# Patient Record
Sex: MALE | Race: WHITE | Employment: OTHER | URBAN - NONMETROPOLITAN AREA
[De-identification: names, ages, dates, MRNs, and addresses within clinical notes are randomized per-mention and may not be internally consistent; named-entity substitution may affect disease eponyms.]

---

## 2019-10-13 ENCOUNTER — APPOINTMENT (OUTPATIENT)
Dept: GENERAL RADIOLOGY | Age: 74
End: 2019-10-13
Payer: MEDICARE

## 2019-10-13 ENCOUNTER — HOSPITAL ENCOUNTER (EMERGENCY)
Age: 74
Discharge: HOME | End: 2019-10-13
Attending: EMERGENCY MEDICINE
Payer: MEDICARE

## 2019-10-13 VITALS
HEART RATE: 91 BPM | SYSTOLIC BLOOD PRESSURE: 151 MMHG | HEIGHT: 64 IN | BODY MASS INDEX: 29.88 KG/M2 | RESPIRATION RATE: 21 BRPM | DIASTOLIC BLOOD PRESSURE: 104 MMHG | TEMPERATURE: 98.1 F | WEIGHT: 175 LBS | OXYGEN SATURATION: 96 %

## 2019-10-13 DIAGNOSIS — J44.1 COPD EXACERBATION (HCC): Primary | ICD-10-CM

## 2019-10-13 DIAGNOSIS — I10 ESSENTIAL HYPERTENSION: ICD-10-CM

## 2019-10-13 LAB
ALBUMIN SERPL-MCNC: 4.2 GM/DL (ref 3.4–5)
ALP BLD-CCNC: 106 IU/L (ref 40–129)
ALT SERPL-CCNC: 52 U/L (ref 10–40)
ANION GAP SERPL CALCULATED.3IONS-SCNC: 10 MMOL/L (ref 4–16)
AST SERPL-CCNC: 24 IU/L (ref 15–37)
BASE EXCESS MIXED: 0.6 (ref 0–1.2)
BASE EXCESS: ABNORMAL (ref 0–3.3)
BASOPHILS ABSOLUTE: 0.1 K/CU MM
BASOPHILS RELATIVE PERCENT: 1.4 % (ref 0–1)
BILIRUB SERPL-MCNC: 0.5 MG/DL (ref 0–1)
BUN BLDV-MCNC: 13 MG/DL (ref 6–23)
CALCIUM SERPL-MCNC: 9.1 MG/DL (ref 8.3–10.6)
CHLORIDE BLD-SCNC: 104 MMOL/L (ref 99–110)
CO2 CONTENT: 24.7 MMOL/L (ref 19–24)
CO2: 25 MMOL/L (ref 21–32)
CREAT SERPL-MCNC: 0.9 MG/DL (ref 0.9–1.3)
DIFFERENTIAL TYPE: ABNORMAL
EOSINOPHILS ABSOLUTE: 0.2 K/CU MM
EOSINOPHILS RELATIVE PERCENT: 2.3 % (ref 0–3)
GFR AFRICAN AMERICAN: >60 ML/MIN/1.73M2
GFR NON-AFRICAN AMERICAN: >60 ML/MIN/1.73M2
GLUCOSE BLD-MCNC: 155 MG/DL (ref 70–99)
HCO3 VENOUS: 23.5 MMOL/L (ref 19–25)
HCT VFR BLD CALC: 46.7 % (ref 42–52)
HEMOGLOBIN: 15.8 GM/DL (ref 13.5–18)
IMMATURE NEUTROPHIL %: 0.6 % (ref 0–0.43)
LYMPHOCYTES ABSOLUTE: 1.4 K/CU MM
LYMPHOCYTES RELATIVE PERCENT: 19.5 % (ref 24–44)
MCH RBC QN AUTO: 30.1 PG (ref 27–31)
MCHC RBC AUTO-ENTMCNC: 33.8 % (ref 32–36)
MCV RBC AUTO: 89 FL (ref 78–100)
MONOCYTES ABSOLUTE: 0.5 K/CU MM
MONOCYTES RELATIVE PERCENT: 7.1 % (ref 0–4)
O2 SAT, VEN: 96.5 % (ref 50–70)
PCO2, VEN: 36.7 MMHG (ref 38–52)
PDW BLD-RTO: 13.4 % (ref 11.7–14.9)
PH VENOUS: 7.41 (ref 7.32–7.42)
PH VENOUS: 7.42 (ref 7.32–7.42)
PLATELET # BLD: 230 K/CU MM (ref 140–440)
PMV BLD AUTO: 8.7 FL (ref 7.5–11.1)
PO2, VEN: 84.4 MMHG (ref 28–48)
POTASSIUM SERPL-SCNC: 3.9 MMOL/L (ref 3.5–5.1)
PRO-BNP: 51 PG/ML
RBC # BLD: 5.25 M/CU MM (ref 4.6–6.2)
SEGMENTED NEUTROPHILS ABSOLUTE COUNT: 4.8 K/CU MM
SEGMENTED NEUTROPHILS RELATIVE PERCENT: 69.1 % (ref 36–66)
SODIUM BLD-SCNC: 139 MMOL/L (ref 135–145)
SOURCE, BLOOD GAS: ABNORMAL
TOTAL IMMATURE NEUTOROPHIL: 0.04 K/CU MM
TOTAL PROTEIN: 6.5 GM/DL (ref 6.4–8.2)
TROPONIN T: <0.01 NG/ML
WBC # BLD: 6.9 K/CU MM (ref 4–10.5)

## 2019-10-13 PROCEDURE — 6370000000 HC RX 637 (ALT 250 FOR IP): Performed by: EMERGENCY MEDICINE

## 2019-10-13 PROCEDURE — 93005 ELECTROCARDIOGRAM TRACING: CPT | Performed by: EMERGENCY MEDICINE

## 2019-10-13 PROCEDURE — 80053 COMPREHEN METABOLIC PANEL: CPT

## 2019-10-13 PROCEDURE — 93010 ELECTROCARDIOGRAM REPORT: CPT | Performed by: INTERNAL MEDICINE

## 2019-10-13 PROCEDURE — 84484 ASSAY OF TROPONIN QUANT: CPT

## 2019-10-13 PROCEDURE — 82800 BLOOD PH: CPT

## 2019-10-13 PROCEDURE — 85025 COMPLETE CBC W/AUTO DIFF WBC: CPT

## 2019-10-13 PROCEDURE — 83880 ASSAY OF NATRIURETIC PEPTIDE: CPT

## 2019-10-13 PROCEDURE — 94640 AIRWAY INHALATION TREATMENT: CPT

## 2019-10-13 PROCEDURE — 99284 EMERGENCY DEPT VISIT MOD MDM: CPT

## 2019-10-13 PROCEDURE — 71046 X-RAY EXAM CHEST 2 VIEWS: CPT

## 2019-10-13 RX ORDER — ACETAMINOPHEN 325 MG/1
650 TABLET ORAL ONCE
Status: COMPLETED | OUTPATIENT
Start: 2019-10-13 | End: 2019-10-13

## 2019-10-13 RX ORDER — LEVOFLOXACIN 500 MG/1
500 TABLET, FILM COATED ORAL DAILY
Qty: 7 TABLET | Refills: 0 | Status: SHIPPED | OUTPATIENT
Start: 2019-10-13 | End: 2019-10-20

## 2019-10-13 RX ORDER — GUAIFENESIN AND DEXTROMETHORPHAN HYDROBROMIDE 600; 30 MG/1; MG/1
1 TABLET, EXTENDED RELEASE ORAL 2 TIMES DAILY
Qty: 28 TABLET | Refills: 0 | Status: SHIPPED | OUTPATIENT
Start: 2019-10-13 | End: 2019-12-26

## 2019-10-13 RX ORDER — IPRATROPIUM BROMIDE AND ALBUTEROL SULFATE 2.5; .5 MG/3ML; MG/3ML
1 SOLUTION RESPIRATORY (INHALATION) ONCE
Status: COMPLETED | OUTPATIENT
Start: 2019-10-13 | End: 2019-10-13

## 2019-10-13 RX ORDER — IPRATROPIUM BROMIDE AND ALBUTEROL SULFATE 2.5; .5 MG/3ML; MG/3ML
1 SOLUTION RESPIRATORY (INHALATION) EVERY 6 HOURS PRN
Qty: 360 ML | Refills: 0 | Status: SHIPPED | OUTPATIENT
Start: 2019-10-13

## 2019-10-13 RX ORDER — LEVOFLOXACIN 500 MG/1
500 TABLET, FILM COATED ORAL ONCE
Status: COMPLETED | OUTPATIENT
Start: 2019-10-13 | End: 2019-10-13

## 2019-10-13 RX ORDER — PREDNISONE 20 MG/1
40 TABLET ORAL ONCE
Status: COMPLETED | OUTPATIENT
Start: 2019-10-13 | End: 2019-10-13

## 2019-10-13 RX ORDER — ACETAMINOPHEN 325 MG/1
650 TABLET ORAL EVERY 6 HOURS PRN
Qty: 120 TABLET | Refills: 3 | Status: SHIPPED | OUTPATIENT
Start: 2019-10-13 | End: 2021-09-12 | Stop reason: ALTCHOICE

## 2019-10-13 RX ORDER — PREDNISONE 10 MG/1
40 TABLET ORAL DAILY
Qty: 16 TABLET | Refills: 0 | Status: SHIPPED | OUTPATIENT
Start: 2019-10-14 | End: 2019-10-18

## 2019-10-13 RX ADMIN — LEVOFLOXACIN 500 MG: 500 TABLET, FILM COATED ORAL at 09:35

## 2019-10-13 RX ADMIN — IPRATROPIUM BROMIDE AND ALBUTEROL SULFATE 1 AMPULE: .5; 3 SOLUTION RESPIRATORY (INHALATION) at 08:35

## 2019-10-13 RX ADMIN — ACETAMINOPHEN 650 MG: 325 TABLET ORAL at 08:36

## 2019-10-13 RX ADMIN — GUAIFENESIN, DEXTROMETHORPHAN HBR 1 TABLET: 600; 30 TABLET ORAL at 08:36

## 2019-10-13 RX ADMIN — PREDNISONE 40 MG: 20 TABLET ORAL at 08:36

## 2019-10-13 ASSESSMENT — PAIN SCALES - GENERAL
PAINLEVEL_OUTOF10: 3
PAINLEVEL_OUTOF10: 3

## 2019-10-13 ASSESSMENT — PAIN DESCRIPTION - ORIENTATION: ORIENTATION: RIGHT

## 2019-10-13 ASSESSMENT — PAIN DESCRIPTION - DESCRIPTORS: DESCRIPTORS: SORE

## 2019-10-13 ASSESSMENT — PAIN DESCRIPTION - LOCATION: LOCATION: SHOULDER

## 2019-10-13 ASSESSMENT — PAIN DESCRIPTION - PAIN TYPE: TYPE: ACUTE PAIN

## 2019-10-15 LAB
EKG ATRIAL RATE: 98 BPM
EKG DIAGNOSIS: NORMAL
EKG P AXIS: 62 DEGREES
EKG P-R INTERVAL: 192 MS
EKG Q-T INTERVAL: 370 MS
EKG QRS DURATION: 84 MS
EKG QTC CALCULATION (BAZETT): 472 MS
EKG R AXIS: -19 DEGREES
EKG T AXIS: 69 DEGREES
EKG VENTRICULAR RATE: 98 BPM

## 2019-10-25 ENCOUNTER — HOSPITAL ENCOUNTER (EMERGENCY)
Age: 74
Discharge: HOME | End: 2019-10-25
Attending: EMERGENCY MEDICINE
Payer: MEDICARE

## 2019-10-25 ENCOUNTER — APPOINTMENT (OUTPATIENT)
Dept: GENERAL RADIOLOGY | Age: 74
End: 2019-10-25
Payer: MEDICARE

## 2019-10-25 VITALS
TEMPERATURE: 98.1 F | DIASTOLIC BLOOD PRESSURE: 64 MMHG | HEART RATE: 102 BPM | RESPIRATION RATE: 16 BRPM | BODY MASS INDEX: 29.88 KG/M2 | HEIGHT: 64 IN | SYSTOLIC BLOOD PRESSURE: 114 MMHG | WEIGHT: 175 LBS | OXYGEN SATURATION: 94 %

## 2019-10-25 DIAGNOSIS — J20.9 ACUTE BRONCHITIS DUE TO INFECTION: Primary | ICD-10-CM

## 2019-10-25 PROCEDURE — 71046 X-RAY EXAM CHEST 2 VIEWS: CPT

## 2019-10-25 PROCEDURE — 6370000000 HC RX 637 (ALT 250 FOR IP): Performed by: EMERGENCY MEDICINE

## 2019-10-25 PROCEDURE — 99283 EMERGENCY DEPT VISIT LOW MDM: CPT

## 2019-10-25 RX ORDER — AZITHROMYCIN 250 MG/1
TABLET, FILM COATED ORAL
Qty: 1 PACKET | Refills: 0 | Status: SHIPPED | OUTPATIENT
Start: 2019-10-25 | End: 2019-11-04

## 2019-10-25 RX ORDER — METHYLPREDNISOLONE 4 MG/1
TABLET ORAL
Qty: 1 KIT | Refills: 0 | Status: SHIPPED | OUTPATIENT
Start: 2019-10-25 | End: 2019-12-26

## 2019-10-25 RX ORDER — PREDNISONE 20 MG/1
60 TABLET ORAL ONCE
Status: COMPLETED | OUTPATIENT
Start: 2019-10-25 | End: 2019-10-25

## 2019-10-25 RX ORDER — AZITHROMYCIN 250 MG/1
500 TABLET, FILM COATED ORAL ONCE
Status: COMPLETED | OUTPATIENT
Start: 2019-10-25 | End: 2019-10-25

## 2019-10-25 RX ADMIN — AZITHROMYCIN MONOHYDRATE 500 MG: 250 TABLET ORAL at 22:30

## 2019-10-25 RX ADMIN — PREDNISONE 60 MG: 20 TABLET ORAL at 22:30

## 2019-10-25 ASSESSMENT — PAIN DESCRIPTION - LOCATION: LOCATION: SHOULDER

## 2019-10-25 ASSESSMENT — PAIN DESCRIPTION - ORIENTATION: ORIENTATION: RIGHT;POSTERIOR

## 2019-10-25 ASSESSMENT — PAIN DESCRIPTION - PAIN TYPE: TYPE: ACUTE PAIN

## 2019-10-25 ASSESSMENT — ENCOUNTER SYMPTOMS
EYES NEGATIVE: 1
SINUS CONGESTION: 1
GASTROINTESTINAL NEGATIVE: 1
RHINORRHEA: 1
COUGH: 1

## 2019-10-25 ASSESSMENT — PAIN DESCRIPTION - DESCRIPTORS: DESCRIPTORS: ACHING;DULL

## 2019-10-25 ASSESSMENT — PAIN - FUNCTIONAL ASSESSMENT: PAIN_FUNCTIONAL_ASSESSMENT: PREVENTS OR INTERFERES SOME ACTIVE ACTIVITIES AND ADLS

## 2019-10-25 ASSESSMENT — PAIN DESCRIPTION - FREQUENCY: FREQUENCY: CONTINUOUS

## 2019-10-25 ASSESSMENT — PAIN DESCRIPTION - ONSET: ONSET: PROGRESSIVE

## 2019-10-25 ASSESSMENT — PAIN SCALES - GENERAL: PAINLEVEL_OUTOF10: 2

## 2019-10-25 ASSESSMENT — PAIN DESCRIPTION - PROGRESSION: CLINICAL_PROGRESSION: NOT CHANGED

## 2019-12-26 ENCOUNTER — HOSPITAL ENCOUNTER (EMERGENCY)
Age: 74
Discharge: HOME | End: 2019-12-26
Attending: EMERGENCY MEDICINE
Payer: MEDICARE

## 2019-12-26 ENCOUNTER — APPOINTMENT (OUTPATIENT)
Dept: GENERAL RADIOLOGY | Age: 74
End: 2019-12-26
Payer: MEDICARE

## 2019-12-26 VITALS
HEIGHT: 64 IN | WEIGHT: 180 LBS | RESPIRATION RATE: 24 BRPM | BODY MASS INDEX: 30.73 KG/M2 | HEART RATE: 112 BPM | OXYGEN SATURATION: 93 % | TEMPERATURE: 99.7 F

## 2019-12-26 DIAGNOSIS — J44.1 COPD EXACERBATION (HCC): Primary | ICD-10-CM

## 2019-12-26 DIAGNOSIS — R06.00 DYSPNEA, UNSPECIFIED TYPE: ICD-10-CM

## 2019-12-26 PROCEDURE — 6370000000 HC RX 637 (ALT 250 FOR IP): Performed by: EMERGENCY MEDICINE

## 2019-12-26 PROCEDURE — 99285 EMERGENCY DEPT VISIT HI MDM: CPT

## 2019-12-26 PROCEDURE — 71046 X-RAY EXAM CHEST 2 VIEWS: CPT

## 2019-12-26 PROCEDURE — 94640 AIRWAY INHALATION TREATMENT: CPT

## 2019-12-26 PROCEDURE — 6360000002 HC RX W HCPCS: Performed by: EMERGENCY MEDICINE

## 2019-12-26 RX ORDER — PREDNISONE 20 MG/1
60 TABLET ORAL ONCE
Status: COMPLETED | OUTPATIENT
Start: 2019-12-26 | End: 2019-12-26

## 2019-12-26 RX ORDER — ALBUTEROL SULFATE 2.5 MG/3ML
2.5 SOLUTION RESPIRATORY (INHALATION) ONCE
Status: COMPLETED | OUTPATIENT
Start: 2019-12-26 | End: 2019-12-26

## 2019-12-26 RX ORDER — IPRATROPIUM BROMIDE AND ALBUTEROL SULFATE 2.5; .5 MG/3ML; MG/3ML
1 SOLUTION RESPIRATORY (INHALATION) ONCE
Status: COMPLETED | OUTPATIENT
Start: 2019-12-26 | End: 2019-12-26

## 2019-12-26 RX ORDER — METHYLPREDNISOLONE 4 MG/1
TABLET ORAL
Qty: 1 KIT | Refills: 0 | Status: SHIPPED | OUTPATIENT
Start: 2019-12-26 | End: 2021-09-12 | Stop reason: ALTCHOICE

## 2019-12-26 RX ADMIN — ALBUTEROL SULFATE 2.5 MG: 2.5 SOLUTION RESPIRATORY (INHALATION) at 05:00

## 2019-12-26 RX ADMIN — PREDNISONE 60 MG: 20 TABLET ORAL at 04:56

## 2019-12-26 RX ADMIN — IPRATROPIUM BROMIDE AND ALBUTEROL SULFATE 1 AMPULE: .5; 3 SOLUTION RESPIRATORY (INHALATION) at 05:00

## 2019-12-27 ENCOUNTER — HOSPITAL ENCOUNTER (EMERGENCY)
Age: 74
Discharge: HOME | End: 2019-12-27
Attending: EMERGENCY MEDICINE
Payer: MEDICARE

## 2019-12-27 ENCOUNTER — APPOINTMENT (OUTPATIENT)
Dept: GENERAL RADIOLOGY | Age: 74
End: 2019-12-27
Payer: MEDICARE

## 2019-12-27 ENCOUNTER — APPOINTMENT (OUTPATIENT)
Dept: CT IMAGING | Age: 74
End: 2019-12-27
Payer: MEDICARE

## 2019-12-27 VITALS
WEIGHT: 180 LBS | RESPIRATION RATE: 21 BRPM | OXYGEN SATURATION: 93 % | BODY MASS INDEX: 30.73 KG/M2 | TEMPERATURE: 98.5 F | DIASTOLIC BLOOD PRESSURE: 73 MMHG | HEIGHT: 64 IN | SYSTOLIC BLOOD PRESSURE: 114 MMHG | HEART RATE: 97 BPM

## 2019-12-27 DIAGNOSIS — J44.1 COPD EXACERBATION (HCC): Primary | ICD-10-CM

## 2019-12-27 LAB
ALBUMIN SERPL-MCNC: 3.8 GM/DL (ref 3.4–5)
ALP BLD-CCNC: 123 IU/L (ref 40–129)
ALT SERPL-CCNC: 120 U/L (ref 10–40)
ANION GAP SERPL CALCULATED.3IONS-SCNC: 14 MMOL/L (ref 4–16)
AST SERPL-CCNC: 41 IU/L (ref 15–37)
BASOPHILS ABSOLUTE: 0 K/CU MM
BASOPHILS RELATIVE PERCENT: 0.2 % (ref 0–1)
BILIRUB SERPL-MCNC: 0.8 MG/DL (ref 0–1)
BUN BLDV-MCNC: 18 MG/DL (ref 6–23)
CALCIUM SERPL-MCNC: 8.9 MG/DL (ref 8.3–10.6)
CHLORIDE BLD-SCNC: 102 MMOL/L (ref 99–110)
CO2: 23 MMOL/L (ref 21–32)
CREAT SERPL-MCNC: 0.9 MG/DL (ref 0.9–1.3)
DIFFERENTIAL TYPE: ABNORMAL
EOSINOPHILS ABSOLUTE: 0 K/CU MM
EOSINOPHILS RELATIVE PERCENT: 0 % (ref 0–3)
GFR AFRICAN AMERICAN: >60 ML/MIN/1.73M2
GFR NON-AFRICAN AMERICAN: >60 ML/MIN/1.73M2
GLUCOSE BLD-MCNC: 166 MG/DL (ref 70–99)
HCT VFR BLD CALC: 42.8 % (ref 42–52)
HEMOGLOBIN: 14.1 GM/DL (ref 13.5–18)
IMMATURE NEUTROPHIL %: 1.1 % (ref 0–0.43)
LYMPHOCYTES ABSOLUTE: 0.5 K/CU MM
LYMPHOCYTES RELATIVE PERCENT: 2.6 % (ref 24–44)
MCH RBC QN AUTO: 29.7 PG (ref 27–31)
MCHC RBC AUTO-ENTMCNC: 32.9 % (ref 32–36)
MCV RBC AUTO: 90.3 FL (ref 78–100)
MONOCYTES ABSOLUTE: 1.1 K/CU MM
MONOCYTES RELATIVE PERCENT: 5.4 % (ref 0–4)
PDW BLD-RTO: 13.8 % (ref 11.7–14.9)
PLATELET # BLD: 267 K/CU MM (ref 140–440)
PMV BLD AUTO: 8.9 FL (ref 7.5–11.1)
POTASSIUM SERPL-SCNC: 4.8 MMOL/L (ref 3.5–5.1)
PRO-BNP: 150.1 PG/ML
RBC # BLD: 4.74 M/CU MM (ref 4.6–6.2)
SEGMENTED NEUTROPHILS ABSOLUTE COUNT: 17.7 K/CU MM
SEGMENTED NEUTROPHILS RELATIVE PERCENT: 90.7 % (ref 36–66)
SODIUM BLD-SCNC: 139 MMOL/L (ref 135–145)
TOTAL IMMATURE NEUTOROPHIL: 0.22 K/CU MM
TOTAL PROTEIN: 6.8 GM/DL (ref 6.4–8.2)
TROPONIN T: <0.01 NG/ML
WBC # BLD: 19.5 K/CU MM (ref 4–10.5)

## 2019-12-27 PROCEDURE — 71275 CT ANGIOGRAPHY CHEST: CPT

## 2019-12-27 PROCEDURE — 94640 AIRWAY INHALATION TREATMENT: CPT

## 2019-12-27 PROCEDURE — 6360000004 HC RX CONTRAST MEDICATION: Performed by: EMERGENCY MEDICINE

## 2019-12-27 PROCEDURE — 99285 EMERGENCY DEPT VISIT HI MDM: CPT

## 2019-12-27 PROCEDURE — 6370000000 HC RX 637 (ALT 250 FOR IP): Performed by: EMERGENCY MEDICINE

## 2019-12-27 PROCEDURE — 83880 ASSAY OF NATRIURETIC PEPTIDE: CPT

## 2019-12-27 PROCEDURE — 93005 ELECTROCARDIOGRAM TRACING: CPT | Performed by: EMERGENCY MEDICINE

## 2019-12-27 PROCEDURE — 71046 X-RAY EXAM CHEST 2 VIEWS: CPT

## 2019-12-27 PROCEDURE — 80053 COMPREHEN METABOLIC PANEL: CPT

## 2019-12-27 PROCEDURE — 93010 ELECTROCARDIOGRAM REPORT: CPT | Performed by: INTERNAL MEDICINE

## 2019-12-27 PROCEDURE — 84484 ASSAY OF TROPONIN QUANT: CPT

## 2019-12-27 PROCEDURE — 85025 COMPLETE CBC W/AUTO DIFF WBC: CPT

## 2019-12-27 RX ORDER — IPRATROPIUM BROMIDE AND ALBUTEROL SULFATE 2.5; .5 MG/3ML; MG/3ML
1 SOLUTION RESPIRATORY (INHALATION) ONCE
Status: COMPLETED | OUTPATIENT
Start: 2019-12-27 | End: 2019-12-27

## 2019-12-27 RX ORDER — DOXYCYCLINE HYCLATE 100 MG
100 TABLET ORAL ONCE
Status: COMPLETED | OUTPATIENT
Start: 2019-12-27 | End: 2019-12-27

## 2019-12-27 RX ORDER — DOXYCYCLINE HYCLATE 100 MG
100 TABLET ORAL 2 TIMES DAILY
Qty: 20 TABLET | Refills: 0 | Status: SHIPPED | OUTPATIENT
Start: 2019-12-27 | End: 2020-01-06

## 2019-12-27 RX ADMIN — IOPAMIDOL 75 ML: 755 INJECTION, SOLUTION INTRAVENOUS at 18:29

## 2019-12-27 RX ADMIN — IPRATROPIUM BROMIDE AND ALBUTEROL SULFATE 1 AMPULE: .5; 3 SOLUTION RESPIRATORY (INHALATION) at 17:06

## 2019-12-27 RX ADMIN — DOXYCYCLINE HYCLATE 100 MG: 100 TABLET, COATED ORAL at 20:00

## 2019-12-27 ASSESSMENT — ENCOUNTER SYMPTOMS
ABDOMINAL PAIN: 0
COUGH: 1
WHEEZING: 1
NAUSEA: 0
SHORTNESS OF BREATH: 1
SHORTNESS OF BREATH: 1
COUGH: 1
SORE THROAT: 0
BACK PAIN: 0
EYE REDNESS: 0
GASTROINTESTINAL NEGATIVE: 1
WHEEZING: 1
RHINORRHEA: 0
VOMITING: 0
EYES NEGATIVE: 1
SPUTUM PRODUCTION: 1

## 2019-12-27 ASSESSMENT — PAIN DESCRIPTION - DESCRIPTORS: DESCRIPTORS: CRAMPING

## 2019-12-27 ASSESSMENT — PAIN SCALES - GENERAL: PAINLEVEL_OUTOF10: 3

## 2019-12-27 ASSESSMENT — PAIN DESCRIPTION - PAIN TYPE: TYPE: ACUTE PAIN

## 2019-12-27 ASSESSMENT — PAIN DESCRIPTION - LOCATION: LOCATION: RIB CAGE

## 2020-01-01 LAB
EKG ATRIAL RATE: 101 BPM
EKG DIAGNOSIS: NORMAL
EKG P AXIS: 32 DEGREES
EKG P-R INTERVAL: 138 MS
EKG Q-T INTERVAL: 362 MS
EKG QRS DURATION: 78 MS
EKG QTC CALCULATION (BAZETT): 469 MS
EKG R AXIS: 17 DEGREES
EKG T AXIS: 48 DEGREES
EKG VENTRICULAR RATE: 101 BPM

## 2021-09-08 ENCOUNTER — HOSPITAL ENCOUNTER (EMERGENCY)
Age: 76
Discharge: LEFT BEFORE DISCHARGE | End: 2021-09-08
Payer: MEDICARE

## 2021-09-09 ENCOUNTER — HOSPITAL ENCOUNTER (EMERGENCY)
Age: 76
Discharge: HOME | End: 2021-09-09
Attending: EMERGENCY MEDICINE
Payer: MEDICARE

## 2021-09-09 VITALS
OXYGEN SATURATION: 95 % | TEMPERATURE: 98.9 F | DIASTOLIC BLOOD PRESSURE: 94 MMHG | WEIGHT: 180 LBS | SYSTOLIC BLOOD PRESSURE: 139 MMHG | BODY MASS INDEX: 30.9 KG/M2 | RESPIRATION RATE: 16 BRPM | HEART RATE: 94 BPM

## 2021-09-09 DIAGNOSIS — M54.50 ACUTE EXACERBATION OF CHRONIC LOW BACK PAIN: Primary | ICD-10-CM

## 2021-09-09 DIAGNOSIS — G89.29 ACUTE EXACERBATION OF CHRONIC LOW BACK PAIN: Primary | ICD-10-CM

## 2021-09-09 PROCEDURE — 99284 EMERGENCY DEPT VISIT MOD MDM: CPT

## 2021-09-09 PROCEDURE — 96372 THER/PROPH/DIAG INJ SC/IM: CPT

## 2021-09-09 PROCEDURE — 6370000000 HC RX 637 (ALT 250 FOR IP): Performed by: EMERGENCY MEDICINE

## 2021-09-09 PROCEDURE — 6360000002 HC RX W HCPCS: Performed by: EMERGENCY MEDICINE

## 2021-09-09 RX ORDER — DIAZEPAM 5 MG/1
5 TABLET ORAL ONCE
Status: COMPLETED | OUTPATIENT
Start: 2021-09-09 | End: 2021-09-09

## 2021-09-09 RX ORDER — ACETAMINOPHEN 500 MG
1000 TABLET ORAL ONCE
Status: COMPLETED | OUTPATIENT
Start: 2021-09-09 | End: 2021-09-09

## 2021-09-09 RX ORDER — CYCLOBENZAPRINE HCL 5 MG
5 TABLET ORAL NIGHTLY PRN
Qty: 10 TABLET | Refills: 0 | Status: SHIPPED | OUTPATIENT
Start: 2021-09-09 | End: 2021-09-12 | Stop reason: ALTCHOICE

## 2021-09-09 RX ORDER — DEXAMETHASONE 4 MG/1
8 TABLET ORAL ONCE
Status: COMPLETED | OUTPATIENT
Start: 2021-09-09 | End: 2021-09-09

## 2021-09-09 RX ORDER — KETOROLAC TROMETHAMINE 15 MG/ML
15 INJECTION, SOLUTION INTRAMUSCULAR; INTRAVENOUS ONCE
Status: COMPLETED | OUTPATIENT
Start: 2021-09-09 | End: 2021-09-09

## 2021-09-09 RX ADMIN — ACETAMINOPHEN 1000 MG: 500 TABLET ORAL at 03:02

## 2021-09-09 RX ADMIN — DEXAMETHASONE 8 MG: 4 TABLET ORAL at 03:02

## 2021-09-09 RX ADMIN — KETOROLAC TROMETHAMINE 15 MG: 15 INJECTION, SOLUTION INTRAMUSCULAR; INTRAVENOUS at 03:02

## 2021-09-09 RX ADMIN — DIAZEPAM 5 MG: 5 TABLET ORAL at 03:06

## 2021-09-09 ASSESSMENT — PAIN SCALES - GENERAL
PAINLEVEL_OUTOF10: 10
PAINLEVEL_OUTOF10: 10

## 2021-09-09 ASSESSMENT — PAIN DESCRIPTION - LOCATION: LOCATION: BACK

## 2021-09-09 ASSESSMENT — PAIN DESCRIPTION - PAIN TYPE: TYPE: ACUTE PAIN

## 2021-09-09 NOTE — ED NOTES
Pt discharged with instructions and prescriptions. Discussed when and how to take medications and pt stated understanding.   Pt walked out of the [facility], RN  09/09/21 8848

## 2021-09-12 ENCOUNTER — HOSPITAL ENCOUNTER (EMERGENCY)
Age: 76
Discharge: HOME | End: 2021-09-12
Attending: EMERGENCY MEDICINE
Payer: MEDICARE

## 2021-09-12 VITALS
HEART RATE: 86 BPM | SYSTOLIC BLOOD PRESSURE: 142 MMHG | HEIGHT: 64 IN | RESPIRATION RATE: 20 BRPM | WEIGHT: 180 LBS | OXYGEN SATURATION: 95 % | TEMPERATURE: 97.8 F | BODY MASS INDEX: 30.73 KG/M2 | DIASTOLIC BLOOD PRESSURE: 94 MMHG

## 2021-09-12 DIAGNOSIS — M79.604 LOW BACK PAIN RADIATING TO RIGHT LOWER EXTREMITY: Primary | ICD-10-CM

## 2021-09-12 DIAGNOSIS — M54.50 LOW BACK PAIN RADIATING TO RIGHT LOWER EXTREMITY: Primary | ICD-10-CM

## 2021-09-12 PROCEDURE — 96372 THER/PROPH/DIAG INJ SC/IM: CPT

## 2021-09-12 PROCEDURE — 6360000002 HC RX W HCPCS: Performed by: EMERGENCY MEDICINE

## 2021-09-12 PROCEDURE — 99283 EMERGENCY DEPT VISIT LOW MDM: CPT

## 2021-09-12 PROCEDURE — 6370000000 HC RX 637 (ALT 250 FOR IP): Performed by: EMERGENCY MEDICINE

## 2021-09-12 RX ORDER — KETOROLAC TROMETHAMINE 15 MG/ML
15 INJECTION, SOLUTION INTRAMUSCULAR; INTRAVENOUS ONCE
Status: COMPLETED | OUTPATIENT
Start: 2021-09-12 | End: 2021-09-12

## 2021-09-12 RX ORDER — METHOCARBAMOL 500 MG/1
500 TABLET, FILM COATED ORAL 3 TIMES DAILY PRN
Qty: 15 TABLET | Refills: 0 | Status: SHIPPED | OUTPATIENT
Start: 2021-09-12 | End: 2021-09-22

## 2021-09-12 RX ORDER — METHYLPREDNISOLONE 4 MG/1
TABLET ORAL
Qty: 1 KIT | Refills: 0 | Status: SHIPPED | OUTPATIENT
Start: 2021-09-12 | End: 2021-09-18

## 2021-09-12 RX ORDER — LIDOCAINE 4 G/G
1 PATCH TOPICAL ONCE
Status: DISCONTINUED | OUTPATIENT
Start: 2021-09-12 | End: 2021-09-12 | Stop reason: HOSPADM

## 2021-09-12 RX ORDER — PREDNISONE 20 MG/1
20 TABLET ORAL ONCE
Status: COMPLETED | OUTPATIENT
Start: 2021-09-12 | End: 2021-09-12

## 2021-09-12 RX ORDER — LIDOCAINE 50 MG/G
1 PATCH TOPICAL DAILY
Qty: 30 PATCH | Refills: 0 | Status: ON HOLD | OUTPATIENT
Start: 2021-09-12 | End: 2022-05-23 | Stop reason: HOSPADM

## 2021-09-12 RX ADMIN — PREDNISONE 20 MG: 20 TABLET ORAL at 02:33

## 2021-09-12 RX ADMIN — KETOROLAC TROMETHAMINE 15 MG: 15 INJECTION, SOLUTION INTRAMUSCULAR; INTRAVENOUS at 02:31

## 2021-09-12 ASSESSMENT — PAIN SCALES - GENERAL
PAINLEVEL_OUTOF10: 6
PAINLEVEL_OUTOF10: 10

## 2021-09-12 NOTE — ED PROVIDER NOTES
Emergency Department Encounter    Patient: Calvin Adame  MRN: [id number]  : 1945  Date of Evaluation: 2021  ED Provider:  Miki Buckner MD      Triage Chief Complaint:   Back Pain (Patient was recently seen here for chronic back pain, was given some medication and discharged, pt states the pain is worse than before  )    [place]:  Calvin Adame is a 68 y.o. male that presents with right lower back pain is since last Tuesday. He denies any known injuries to his back. He states it occasionally radiates down the anterior side of his right leg. Denies any numbness or tingling. He felt the leg was slightly weak yesterday but is not weak today. He has been seeing a chiropractor throughout this time for the pain. No saddle anesthesia, no bowel or bladder dysfunction, no rash, no fevers, no hx of IV drug use. He was seen in the emergency department a few days ago and was given Decadron and a muscle relaxant. He like the medications helped initially but are no longer helping his symptoms. ROS - see HPI, below listed is current ROS at time of my eval:  General:  No fevers  ENT:  No sore throat, no nasal congestion  Cardiovascular:  No chest pain  Respiratory:  No shortness of breath  Gastrointestinal:  No pain, no nausea, no vomiting, no diarrhea  Musculoskeletal:  + back pain, + muscle pain  Skin:  No rash, no pruritis  Neurologic:  No headache, no extremity numbness, no extremity tingling, no extremity weakness  Genitourinary:  No dysuria, no hematuria  Endocrine:  No unexpected weight gain, no unexpected weight loss  Extremities:  no edema, no pain    Past Medical History:   Diagnosis Date    Chronic back pain     Emphysema of lung ([address]     Pneumonia      Past Surgical History:   Procedure Laterality Date    CHOLECYSTECTOMY       No family history on file.   Social History     Socioeconomic History    Marital status:      Spouse name: Not on file    Number of children: Not on file    Years of education: Not on file    Highest education level: Not on file   Occupational History    Not on file   Tobacco Use    Smoking status: Former Smoker     Packs/day: 0.00     Quit date: 2017     Years since quittin.2    Smokeless tobacco: Never Used   Vaping Use    Vaping Use: Never used   Substance and Sexual Activity    Alcohol use: No    Drug use: No    Sexual activity: Yes     Partners: Female   Other Topics Concern    Not on file   Social History Narrative    Not on file     Social Determinants of Health     Financial Resource Strain:     Difficulty of Paying Living Expenses:    Food Insecurity:     Worried About Running Out of Food in the Last Year:     [address] in the Last Year:    Transportation Needs:     Lack of Transportation (Medical):  Lack of Transportation (Non-Medical):    Physical Activity:     Days of Exercise per Week:     Minutes of Exercise per Session:    Stress:     Feeling of Stress :    Social Connections:     Frequency of Communication with Friends and Family:     Frequency of Social Gatherings with Friends and Family:     Attends Orthodoxy Services:     Active Member of Clubs or Organizations:     Attends Club or Organization Meetings:     Marital Status:    Intimate Partner Violence:     Fear of Current or Ex-Partner:     Emotionally Abused:     Physically Abused:     Sexually Abused:      Current Facility-Administered Medications   Medication Dose Route Frequency Provider Last Rate Last Admin    lidocaine 4 % external patch 1 patch  1 patch TransDERmal Once Violet Fernandez MD   1 patch at 21 0232     Current Outpatient Medications   Medication Sig Dispense Refill    lidocaine (LIDODERM) 5 % Place 1 patch onto the skin daily 12 hours on, 12 hours off. 30 patch 0    methylPREDNISolone (MEDROL, RONNI,) 4 MG tablet Take by mouth.  1 kit 0    methocarbamol (ROBAXIN) 500 MG tablet Take 1 tablet by mouth 3 times daily as needed (muscle spasm) 15 tablet 0    fluticasone-salmeterol (ADVAIR) 250-50 MCG/DOSE AEPB Inhale 1 puff into the lungs every 12 hours      ipratropium-albuterol (DUONEB) 0.5-2.5 (3) MG/3ML SOLN nebulizer solution Inhale 3 mLs into the lungs every 6 hours as needed for Shortness of Breath 360 mL 0    albuterol sulfate  (90 BASE) MCG/ACT inhaler Inhale 2 puffs into the lungs every 6 hours as needed for Wheezing       No Known Allergies    Nursing Notes Reviewed    Physical Exam:  Triage VS:    ED Triage Vitals   Enc Vitals Group      BP 09/12/21 0139 (!) 142/94      Pulse 09/12/21 0134 86      Resp 09/12/21 0134 20      Temp 09/12/21 0134 97.8 °F (36.6 °C)      Temp Source 09/12/21 0134 Oral      SpO2 09/12/21 0134 95 %      Weight 09/12/21 0130 180 lb (81.6 kg)      Height 09/12/21 0130 5' 4\" (1.626 m)      Head Circumference --       Peak Flow --       Pain Score --       Pain Loc --       Pain Edu? --       Excl. in [address]? --        My pulse ox interpretation is - normal    General appearance:  No acute distress. Skin:  Warm. Dry. No Rash   Eye:  Extraocular movements intact. Ears, nose, mouth and throat:  Oral mucosa moist   Neck:  Trachea midline. Extremity:  No swelling. Normal ROM     Heart:  Regular  Perfusion:  Intact, 2+ PT pulses in bilateral lower extremities  Respiratory:   Respirations nonlabored. Abdominal:  Normal bowel sounds. Soft. Nontender. Non distended. Back:  No CVA tenderness to palpation, no midline spinal tenderness to palpation or step-offs, mild right upper lumbar paraspinal muscle tenderness to palpation, mild spasm at area of tenderness. Neurological:  Alert and oriented times 3.  5 out of 5 motor strength bilateral lower extremities, sensation intact to light touch in bilateral lower                                  extremities, lower extremity reflexes of the patella and achilles are equal and intact. Straight leg test is not present. Normal gait.      I have reviewed and interpreted all of the currently available lab results from this visit (if applicable):  No results found for this visit on 09/12/21. Radiographs (if obtained):  Radiologist's Report Reviewed:  No results found. MDM:  Patient presented with back pain. There is no evidence for more malignant injury/pathology, such as, but not limited to, cauda equina syndrome, acute spinal cord pathology, Spinal epidural abscess    I completed a structured, evidence-based clinical evaluation to screen for acute non-traumatic spinal emergencies. The patient has a normal detailed neurologic exam and a low red flag score. Patient does not have any urinary complaints or abdominal complaints. Patient taking medications at home with no significant improvement. Pt with no evidence of neurologic or vascular compromise. Bowel and Bladder function intact. Pt able to ambulate with mild pain. No fever or redness over spine. No recent systemic infections. Pt has no history of IV drug use, immunosuppression, recent spinal fractures, recent spinal procedures or indwelling urinary catheter. Pt's pain controlled with pain medication. Treated with a lidocaine patch Toradol and a dose of prednisone in the emergency room. Will discharge home with a prescription for a Medrol dose pack. The evidence indicates that the patient is very low risk for an acute spinal emergency and this is consistent with my clinical intuition, therefore, it is in the patients best interest not to do additional emergent testing. Patient will be given medications, I do believe the patient is a good candidate for outpatient symptomatic treatment.   The patient was instructed on this treatment and the course that this type of back pain typically follows, I also discussed worrisome symptoms to monitor for at home including, but not limited to, numbness or weakness in the lower extremities, bowel or bladder dysfunction, and numbness in the groin. Patient will be discharged with prescriptions, instructions for symptomatic treatment, monitoring and outpatient follow-up, patient understands and agrees, patient given strict return precautions. Patient plans to follow-up with his primary care physician first thing on Monday morning. Plan of care explained to patient. Concerning signs and symptoms warranting a return visit to the Emergency Department were explained in detail. All questions and concerns were addressed to the patient's satisfaction. Patient understood and agreed with plan. I did don appropriate PPE (including face mask, protective eye ware/safety glasses and gloves), as recommended by the health facility/national standard best practice, during my bedside interactions with the patient. Clinical Impression:  1. Low back pain radiating to right lower extremity      Disposition referral (if applicable):  Colonel Mukul MD  [address] [place] [address] [address]  [phone]    In 2 days      [facility] Emergency Department  [facility] [address]  [address] [facility]  [phone]    As needed, If symptoms worsen    Disposition medications (if applicable):  New Prescriptions    LIDOCAINE (LIDODERM) 5 %    Place 1 patch onto the skin daily 12 hours on, 12 hours off. METHOCARBAMOL (ROBAXIN) 500 MG TABLET    Take 1 tablet by mouth 3 times daily as needed (muscle spasm)    METHYLPREDNISOLONE (MEDROL, RONNI,) 4 MG TABLET    Take by mouth. ED Provider Disposition Time  DISPOSITION Decision To Discharge 09/12/2021 03:16:00 AM      Comment: Please note this report has been produced using speech recognition software and may contain errors related to that system including errors in grammar, punctuation, and spelling, as well as words and phrases that may be inappropriate. Efforts were made to edit the dictations.         Cassi Evans MD  09/12/21 7624

## 2021-09-12 NOTE — ED PROVIDER NOTES
Emergency Department Encounter    Patient: Marleny Dye  MRN: [id number]  : 1945  Date of Evaluation: 2021  ED Provider:  Melissa Mendoza MD      Triage Chief Complaint:   Back Pain    [place]:  Marleny Dye is a 68 y.o. male that presents with acute on chronic exacerbation of low back pain. Pain is located in his lower back and is bilateral.  He does report some right-sided radiation to knee. No saddle anesthesia, no bowel or bladder dysfunction, no rash. No pain with urination no increase in urgency or frequency urination. No fever or chills. Patient reports intermittent symptoms that were similar in the past.  Patient denies any acute injury. Patient ports pain exacerbated by movement alleviated with rest.    ROS - see HPI, below listed is current ROS at time of my eval:  General:  No fevers  ENT:  No sore throat, no nasal congestion  Cardiovascular:  No chest pain  Respiratory:  No shortness of breath  Gastrointestinal:  No pain, no nausea, no vomiting, no diarrhea  Musculoskeletal:  + back pain, + muscle pain  Skin:  No rash, no pruritis  Neurologic:  No headache, no extremity numbness, no extremity tingling, no extremity weakness  Genitourinary:  No dysuria, no hematuria  Endocrine:  No unexpected weight gain, no unexpected weight loss  Extremities:  no edema, no pain    Past Medical History:   Diagnosis Date    Chronic back pain     Emphysema of lung ([address]     Pneumonia      Past Surgical History:   Procedure Laterality Date    CHOLECYSTECTOMY       No family history on file.   Social History     Socioeconomic History    Marital status:      Spouse name: Not on file    Number of children: Not on file    Years of education: Not on file    Highest education level: Not on file   Occupational History    Not on file   Tobacco Use    Smoking status: Former Smoker     Packs/day: 0.00     Quit date: 2017     Years since quittin.2    Smokeless tobacco: Never Used   Vaping Use    Vaping Use: Never used   Substance and Sexual Activity    Alcohol use: No    Drug use: No    Sexual activity: Yes     Partners: Female   Other Topics Concern    Not on file   Social History Narrative    Not on file     Social Determinants of Health     Financial Resource Strain:     Difficulty of Paying Living Expenses:    Food Insecurity:     Worried About Running Out of Food in the Last Year:     [address] in the Last Year:    Transportation Needs:     Lack of Transportation (Medical):  Lack of Transportation (Non-Medical):    Physical Activity:     Days of Exercise per Week:     Minutes of Exercise per Session:    Stress:     Feeling of Stress :    Social Connections:     Frequency of Communication with Friends and Family:     Frequency of Social Gatherings with Friends and Family:     Attends Jainism Services:     Active Member of Clubs or Organizations:     Attends Club or Organization Meetings:     Marital Status:    Intimate Partner Violence:     Fear of Current or Ex-Partner:     Emotionally Abused:     Physically Abused:     Sexually Abused:      No current facility-administered medications for this encounter. Current Outpatient Medications   Medication Sig Dispense Refill    lidocaine (LIDODERM) 5 % Place 1 patch onto the skin daily 12 hours on, 12 hours off. 30 patch 0    methylPREDNISolone (MEDROL, RONNI,) 4 MG tablet Take by mouth.  1 kit 0    methocarbamol (ROBAXIN) 500 MG tablet Take 1 tablet by mouth 3 times daily as needed (muscle spasm) 15 tablet 0    fluticasone-salmeterol (ADVAIR) 250-50 MCG/DOSE AEPB Inhale 1 puff into the lungs every 12 hours      ipratropium-albuterol (DUONEB) 0.5-2.5 (3) MG/3ML SOLN nebulizer solution Inhale 3 mLs into the lungs every 6 hours as needed for Shortness of Breath 360 mL 0    albuterol sulfate  (90 BASE) MCG/ACT inhaler Inhale 2 puffs into the lungs every 6 hours as needed for Wheezing Facility-Administered Medications Ordered in Other Encounters   Medication Dose Route Frequency Provider Last Rate Last Admin    lidocaine 4 % external patch 1 patch  1 patch TransDERmal Once Ángela Kilpatrick MD   1 patch at 09/12/21 0232     No Known Allergies    Nursing Notes Reviewed    Physical Exam:  Triage VS:    ED Triage Vitals [09/09/21 0242]   Enc Vitals Group      BP (!) 139/94      Pulse 94      Resp 16      Temp 98.9 °F (37.2 °C)      Temp Source Oral      SpO2 95 %      Weight 180 lb (81.6 kg)      Height       Head Circumference       Peak Flow       Pain Score       Pain Loc       Pain Edu? Excl. in [address]? My pulse ox interpretation is - normal    General appearance:  No acute distress. Skin:  Warm. Dry. No Rash   Eye:  Extraocular movements intact. Ears, nose, mouth and throat:  Oral mucosa moist   Neck:  Trachea midline. Extremity:  No swelling. Normal ROM     Heart:  Regular  Perfusion:  intact  Respiratory:   Respirations nonlabored. Abdominal:  Normal bowel sounds. Soft. Nontender. Non distended. Back:  No CVA tenderness to palpation, no midline spinal tenderness to palpation or step-offs, mild bilateral paraspinal muscle tenderness to palpation along with SI joint tenderness, no spasm             Neurological:  Alert and oriented times 3.  5 out of 5 motor strength bilateral lower extremities, sensation intact to light touch in bilateral lower                                  extremities, lower extremity reflexes of the patella and achilles are equal and intact. Straight leg test is not present. I have reviewed and interpreted all of the currently available lab results from this visit (if applicable):  No results found for this visit on 09/09/21. Radiographs (if obtained):  Radiologist's Report Reviewed:  No results found.     Medications   ketorolac (TORADOL) injection 15 mg (15 mg IntraMUSCular Given 9/9/21 0302)   acetaminophen (TYLENOL) tablet 1,000 mg

## 2022-05-21 ENCOUNTER — APPOINTMENT (OUTPATIENT)
Dept: CT IMAGING | Age: 77
DRG: 192 | End: 2022-05-21
Payer: MEDICARE

## 2022-05-21 ENCOUNTER — APPOINTMENT (OUTPATIENT)
Dept: GENERAL RADIOLOGY | Age: 77
DRG: 192 | End: 2022-05-21
Payer: MEDICARE

## 2022-05-21 ENCOUNTER — HOSPITAL ENCOUNTER (INPATIENT)
Age: 77
LOS: 2 days | Discharge: HOME | DRG: 192 | End: 2022-05-23
Attending: EMERGENCY MEDICINE | Admitting: HOSPITALIST
Payer: MEDICARE

## 2022-05-21 DIAGNOSIS — J44.1 COPD WITH ACUTE EXACERBATION (HCC): ICD-10-CM

## 2022-05-21 DIAGNOSIS — R09.02 HYPOXIA: Primary | ICD-10-CM

## 2022-05-21 DIAGNOSIS — R06.03 RESPIRATORY DISTRESS: ICD-10-CM

## 2022-05-21 PROBLEM — M54.30 SCIATICA: Status: ACTIVE | Noted: 2022-05-21

## 2022-05-21 PROBLEM — J44.9 CHRONIC AIRWAY OBSTRUCTION (HCC): Status: ACTIVE | Noted: 2022-05-21

## 2022-05-21 PROBLEM — M51.26 DISPLACEMENT OF LUMBAR INTERVERTEBRAL DISC WITHOUT MYELOPATHY: Status: ACTIVE | Noted: 2022-05-21

## 2022-05-21 PROBLEM — E66.9 OBESITY WITH BODY MASS INDEX 30 OR GREATER: Status: ACTIVE | Noted: 2022-05-21

## 2022-05-21 PROBLEM — I10 ESSENTIAL HYPERTENSION: Status: ACTIVE | Noted: 2022-05-21

## 2022-05-21 LAB
ADENOVIRUS DETECTION BY PCR: NOT DETECTED
ALBUMIN SERPL-MCNC: 4.3 GM/DL (ref 3.4–5)
ALP BLD-CCNC: 152 IU/L (ref 40–129)
ALT SERPL-CCNC: 56 U/L (ref 10–40)
ANION GAP SERPL CALCULATED.3IONS-SCNC: 11 MMOL/L (ref 4–16)
AST SERPL-CCNC: 36 IU/L (ref 15–37)
BACTERIA: 0 /HPF
BASOPHILS ABSOLUTE: 0.2 K/CU MM
BASOPHILS RELATIVE PERCENT: 0.6 % (ref 0–1)
BILIRUB SERPL-MCNC: 1.4 MG/DL (ref 0–1)
BILIRUBIN URINE: NEGATIVE MG/DL
BLOOD, URINE: NEGATIVE
BORDETELLA PARAPERTUSSIS BY PCR: NOT DETECTED
BORDETELLA PERTUSSIS PCR: NOT DETECTED
BUN BLDV-MCNC: 13 MG/DL (ref 6–23)
CALCIUM SERPL-MCNC: 8.8 MG/DL (ref 8.3–10.6)
CAST TYPE: 0 /HPF
CHLAMYDOPHILA PNEUMONIA PCR: NOT DETECTED
CHLORIDE BLD-SCNC: 101 MMOL/L (ref 99–110)
CLARITY: CLEAR
CO2: 26 MMOL/L (ref 21–32)
COLOR: YELLOW
CORONAVIRUS 229E PCR: NOT DETECTED
CORONAVIRUS HKU1 PCR: NOT DETECTED
CORONAVIRUS NL63 PCR: NOT DETECTED
CORONAVIRUS OC43 PCR: NOT DETECTED
CREAT SERPL-MCNC: 0.8 MG/DL (ref 0.9–1.3)
CRYSTAL TYPE: 0 /HPF
DIFFERENTIAL TYPE: ABNORMAL
EOSINOPHILS ABSOLUTE: 0.1 K/CU MM
EOSINOPHILS RELATIVE PERCENT: 0.2 % (ref 0–3)
EPITHELIAL CELLS, UA: 0 /HPF
GFR AFRICAN AMERICAN: >60 ML/MIN/1.73M2
GFR NON-AFRICAN AMERICAN: >60 ML/MIN/1.73M2
GLUCOSE BLD-MCNC: 149 MG/DL (ref 70–99)
GLUCOSE, URINE: NEGATIVE MG/DL
HCT VFR BLD CALC: 48.9 % (ref 42–52)
HEMOGLOBIN: 16.1 GM/DL (ref 13.5–18)
HUMAN METAPNEUMOVIRUS PCR: NOT DETECTED
IMMATURE NEUTROPHIL %: 0.8 % (ref 0–0.43)
INFLUENZA A BY PCR: NOT DETECTED
INFLUENZA A H1 (2009) PCR: NOT DETECTED
INFLUENZA A H1 PANDEMIC PCR: NOT DETECTED
INFLUENZA A H3 PCR: NOT DETECTED
INFLUENZA B BY PCR: NOT DETECTED
KETONES, URINE: NEGATIVE MG/DL
LACTATE: 0.9 MMOL/L (ref 0.4–2)
LEUKOCYTE ESTERASE, URINE: NEGATIVE
LIPASE: 42 IU/L (ref 13–60)
LYMPHOCYTES ABSOLUTE: 1.4 K/CU MM
LYMPHOCYTES RELATIVE PERCENT: 4.5 % (ref 24–44)
MCH RBC QN AUTO: 28.7 PG (ref 27–31)
MCHC RBC AUTO-ENTMCNC: 32.9 % (ref 32–36)
MCV RBC AUTO: 87.2 FL (ref 78–100)
MONOCYTES ABSOLUTE: 2.7 K/CU MM
MONOCYTES RELATIVE PERCENT: 8.5 % (ref 0–4)
MYCOPLASMA PNEUMONIAE PCR: NOT DETECTED
NITRITE URINE, QUANTITATIVE: NEGATIVE
PARAINFLUENZA 1 PCR: NOT DETECTED
PARAINFLUENZA 2 PCR: NOT DETECTED
PARAINFLUENZA 3 PCR: NOT DETECTED
PARAINFLUENZA 4 PCR: NOT DETECTED
PDW BLD-RTO: 14.6 % (ref 11.7–14.9)
PH, URINE: 6.5 (ref 5–8)
PLATELET # BLD: 281 K/CU MM (ref 140–440)
PMV BLD AUTO: 8.8 FL (ref 7.5–11.1)
POTASSIUM SERPL-SCNC: 4 MMOL/L (ref 3.5–5.1)
PRO-BNP: 61.07 PG/ML
PROTEIN UA: 100 MG/DL
RBC # BLD: 5.61 M/CU MM (ref 4.6–6.2)
RBC URINE: 0 /HPF (ref 0–3)
RHINOVIRUS ENTEROVIRUS PCR: NOT DETECTED
RSV PCR: NOT DETECTED
SARS-COV-2: NOT DETECTED
SEGMENTED NEUTROPHILS ABSOLUTE COUNT: 27.3 K/CU MM
SEGMENTED NEUTROPHILS RELATIVE PERCENT: 85.4 % (ref 36–66)
SODIUM BLD-SCNC: 138 MMOL/L (ref 135–145)
SPECIFIC GRAVITY UA: 1.01 (ref 1–1.03)
TOTAL IMMATURE NEUTOROPHIL: 0.26 K/CU MM
TOTAL PROTEIN: 7.4 GM/DL (ref 6.4–8.2)
TROPONIN T: <0.01 NG/ML
UROBILINOGEN, URINE: 1 MG/DL (ref 0.2–1)
WBC # BLD: 32 K/CU MM (ref 4–10.5)
WBC UA: 0 /HPF (ref 0–2)

## 2022-05-21 PROCEDURE — 96365 THER/PROPH/DIAG IV INF INIT: CPT

## 2022-05-21 PROCEDURE — 71045 X-RAY EXAM CHEST 1 VIEW: CPT

## 2022-05-21 PROCEDURE — 84443 ASSAY THYROID STIM HORMONE: CPT

## 2022-05-21 PROCEDURE — 2700000000 HC OXYGEN THERAPY PER DAY

## 2022-05-21 PROCEDURE — 83690 ASSAY OF LIPASE: CPT

## 2022-05-21 PROCEDURE — 6360000002 HC RX W HCPCS: Performed by: EMERGENCY MEDICINE

## 2022-05-21 PROCEDURE — 81001 URINALYSIS AUTO W/SCOPE: CPT

## 2022-05-21 PROCEDURE — 94761 N-INVAS EAR/PLS OXIMETRY MLT: CPT

## 2022-05-21 PROCEDURE — 0202U NFCT DS 22 TRGT SARS-COV-2: CPT

## 2022-05-21 PROCEDURE — 6360000004 HC RX CONTRAST MEDICATION: Performed by: EMERGENCY MEDICINE

## 2022-05-21 PROCEDURE — 6360000002 HC RX W HCPCS: Performed by: NURSE PRACTITIONER

## 2022-05-21 PROCEDURE — 2580000003 HC RX 258: Performed by: NURSE PRACTITIONER

## 2022-05-21 PROCEDURE — 84484 ASSAY OF TROPONIN QUANT: CPT

## 2022-05-21 PROCEDURE — 87899 AGENT NOS ASSAY W/OPTIC: CPT

## 2022-05-21 PROCEDURE — 2580000003 HC RX 258: Performed by: EMERGENCY MEDICINE

## 2022-05-21 PROCEDURE — 83605 ASSAY OF LACTIC ACID: CPT

## 2022-05-21 PROCEDURE — 83880 ASSAY OF NATRIURETIC PEPTIDE: CPT

## 2022-05-21 PROCEDURE — 71275 CT ANGIOGRAPHY CHEST: CPT

## 2022-05-21 PROCEDURE — 84145 PROCALCITONIN (PCT): CPT

## 2022-05-21 PROCEDURE — 96367 TX/PROPH/DG ADDL SEQ IV INF: CPT

## 2022-05-21 PROCEDURE — 99285 EMERGENCY DEPT VISIT HI MDM: CPT

## 2022-05-21 PROCEDURE — 80061 LIPID PANEL: CPT

## 2022-05-21 PROCEDURE — 80053 COMPREHEN METABOLIC PANEL: CPT

## 2022-05-21 PROCEDURE — 6370000000 HC RX 637 (ALT 250 FOR IP): Performed by: EMERGENCY MEDICINE

## 2022-05-21 PROCEDURE — 87449 NOS EACH ORGANISM AG IA: CPT

## 2022-05-21 PROCEDURE — 94660 CPAP INITIATION&MGMT: CPT

## 2022-05-21 PROCEDURE — 87040 BLOOD CULTURE FOR BACTERIA: CPT

## 2022-05-21 PROCEDURE — 2140000000 HC CCU INTERMEDIATE R&B

## 2022-05-21 PROCEDURE — 6370000000 HC RX 637 (ALT 250 FOR IP): Performed by: NURSE PRACTITIONER

## 2022-05-21 PROCEDURE — 94640 AIRWAY INHALATION TREATMENT: CPT

## 2022-05-21 PROCEDURE — 83036 HEMOGLOBIN GLYCOSYLATED A1C: CPT

## 2022-05-21 PROCEDURE — 85025 COMPLETE CBC W/AUTO DIFF WBC: CPT

## 2022-05-21 RX ORDER — IPRATROPIUM BROMIDE AND ALBUTEROL SULFATE 2.5; .5 MG/3ML; MG/3ML
SOLUTION RESPIRATORY (INHALATION)
Status: DISPENSED
Start: 2022-05-21 | End: 2022-05-22

## 2022-05-21 RX ORDER — SODIUM CHLORIDE 9 MG/ML
INJECTION, SOLUTION INTRAVENOUS CONTINUOUS
Status: ACTIVE | OUTPATIENT
Start: 2022-05-21 | End: 2022-05-22

## 2022-05-21 RX ORDER — IPRATROPIUM BROMIDE AND ALBUTEROL SULFATE 2.5; .5 MG/3ML; MG/3ML
1 SOLUTION RESPIRATORY (INHALATION) ONCE
Status: DISCONTINUED | OUTPATIENT
Start: 2022-05-21 | End: 2022-05-21

## 2022-05-21 RX ORDER — 0.9 % SODIUM CHLORIDE 0.9 %
30 INTRAVENOUS SOLUTION INTRAVENOUS ONCE
Status: COMPLETED | OUTPATIENT
Start: 2022-05-21 | End: 2022-05-21

## 2022-05-21 RX ORDER — SENNA PLUS 8.6 MG/1
1 TABLET ORAL DAILY PRN
Status: DISCONTINUED | OUTPATIENT
Start: 2022-05-21 | End: 2022-05-23 | Stop reason: HOSPADM

## 2022-05-21 RX ORDER — METHYLPREDNISOLONE SODIUM SUCCINATE 40 MG/ML
40 INJECTION, POWDER, LYOPHILIZED, FOR SOLUTION INTRAMUSCULAR; INTRAVENOUS EVERY 6 HOURS
Status: DISCONTINUED | OUTPATIENT
Start: 2022-05-22 | End: 2022-05-23 | Stop reason: HOSPADM

## 2022-05-21 RX ORDER — SODIUM CHLORIDE 0.9 % (FLUSH) 0.9 %
5-40 SYRINGE (ML) INJECTION 2 TIMES DAILY
Status: DISCONTINUED | OUTPATIENT
Start: 2022-05-22 | End: 2022-05-23 | Stop reason: HOSPADM

## 2022-05-21 RX ORDER — METHYLPREDNISOLONE SODIUM SUCCINATE 125 MG/2ML
60 INJECTION, POWDER, LYOPHILIZED, FOR SOLUTION INTRAMUSCULAR; INTRAVENOUS ONCE
Status: DISCONTINUED | OUTPATIENT
Start: 2022-05-21 | End: 2022-05-23 | Stop reason: HOSPADM

## 2022-05-21 RX ORDER — IPRATROPIUM BROMIDE AND ALBUTEROL SULFATE 2.5; .5 MG/3ML; MG/3ML
1 SOLUTION RESPIRATORY (INHALATION) 4 TIMES DAILY
Status: DISCONTINUED | OUTPATIENT
Start: 2022-05-21 | End: 2022-05-23 | Stop reason: HOSPADM

## 2022-05-21 RX ORDER — SODIUM CHLORIDE 0.9 % (FLUSH) 0.9 %
5-40 SYRINGE (ML) INJECTION PRN
Status: DISCONTINUED | OUTPATIENT
Start: 2022-05-21 | End: 2022-05-23 | Stop reason: HOSPADM

## 2022-05-21 RX ORDER — ACETAMINOPHEN 650 MG/1
650 SUPPOSITORY RECTAL EVERY 6 HOURS PRN
Status: DISCONTINUED | OUTPATIENT
Start: 2022-05-21 | End: 2022-05-23 | Stop reason: HOSPADM

## 2022-05-21 RX ORDER — IPRATROPIUM BROMIDE AND ALBUTEROL SULFATE 2.5; .5 MG/3ML; MG/3ML
3 SOLUTION RESPIRATORY (INHALATION) ONCE
Status: COMPLETED | OUTPATIENT
Start: 2022-05-21 | End: 2022-05-21

## 2022-05-21 RX ORDER — ACETAMINOPHEN 325 MG/1
650 TABLET ORAL EVERY 6 HOURS PRN
Status: DISCONTINUED | OUTPATIENT
Start: 2022-05-21 | End: 2022-05-23 | Stop reason: HOSPADM

## 2022-05-21 RX ORDER — PREDNISONE 20 MG/1
40 TABLET ORAL DAILY
Status: DISCONTINUED | OUTPATIENT
Start: 2022-05-24 | End: 2022-05-23 | Stop reason: HOSPADM

## 2022-05-21 RX ORDER — ENOXAPARIN SODIUM 100 MG/ML
40 INJECTION SUBCUTANEOUS DAILY
Status: DISCONTINUED | OUTPATIENT
Start: 2022-05-22 | End: 2022-05-23 | Stop reason: HOSPADM

## 2022-05-21 RX ORDER — M-VIT,TX,IRON,MINS/CALC/FOLIC 27MG-0.4MG
1 TABLET ORAL DAILY
COMMUNITY

## 2022-05-21 RX ORDER — SODIUM CHLORIDE 9 MG/ML
25 INJECTION, SOLUTION INTRAVENOUS PRN
Status: DISCONTINUED | OUTPATIENT
Start: 2022-05-21 | End: 2022-05-23 | Stop reason: HOSPADM

## 2022-05-21 RX ORDER — GUAIFENESIN 400 MG/1
400 TABLET ORAL DAILY
COMMUNITY

## 2022-05-21 RX ADMIN — SODIUM CHLORIDE: 9 INJECTION, SOLUTION INTRAVENOUS at 21:30

## 2022-05-21 RX ADMIN — IOPAMIDOL 75 ML: 755 INJECTION, SOLUTION INTRAVENOUS at 21:06

## 2022-05-21 RX ADMIN — MOMETASONE FUROATE AND FORMOTEROL FUMARATE DIHYDRATE 2 PUFF: 100; 5 AEROSOL RESPIRATORY (INHALATION) at 23:33

## 2022-05-21 RX ADMIN — SODIUM CHLORIDE 1000 ML: 9 INJECTION, SOLUTION INTRAVENOUS at 21:19

## 2022-05-21 RX ADMIN — IPRATROPIUM BROMIDE AND ALBUTEROL SULFATE 3 AMPULE: .5; 3 SOLUTION RESPIRATORY (INHALATION) at 18:50

## 2022-05-21 RX ADMIN — IPRATROPIUM BROMIDE AND ALBUTEROL SULFATE 3 ML: .5; 3 SOLUTION RESPIRATORY (INHALATION) at 23:27

## 2022-05-21 RX ADMIN — AZITHROMYCIN MONOHYDRATE 500 MG: 500 INJECTION, POWDER, LYOPHILIZED, FOR SOLUTION INTRAVENOUS at 21:21

## 2022-05-21 RX ADMIN — CEFTRIAXONE SODIUM 1000 MG: 1 INJECTION, POWDER, FOR SOLUTION INTRAMUSCULAR; INTRAVENOUS at 20:13

## 2022-05-21 RX ADMIN — METHYLPREDNISOLONE SODIUM SUCCINATE 40 MG: 40 INJECTION, POWDER, FOR SOLUTION INTRAMUSCULAR; INTRAVENOUS at 23:30

## 2022-05-21 ASSESSMENT — PAIN DESCRIPTION - DESCRIPTORS: DESCRIPTORS: DISCOMFORT

## 2022-05-21 ASSESSMENT — ENCOUNTER SYMPTOMS
COUGH: 1
WHEEZING: 1
EYES NEGATIVE: 1
SHORTNESS OF BREATH: 1
GASTROINTESTINAL NEGATIVE: 1

## 2022-05-21 ASSESSMENT — PAIN DESCRIPTION - PAIN TYPE: TYPE: ACUTE PAIN

## 2022-05-21 ASSESSMENT — PAIN SCALES - GENERAL: PAINLEVEL_OUTOF10: 3

## 2022-05-21 ASSESSMENT — PAIN DESCRIPTION - FREQUENCY: FREQUENCY: CONTINUOUS

## 2022-05-21 ASSESSMENT — PAIN - FUNCTIONAL ASSESSMENT: PAIN_FUNCTIONAL_ASSESSMENT: 0-10

## 2022-05-21 NOTE — ED PROVIDER NOTES
The history is provided by the patient. Shortness of Breath  Severity:  Severe  Onset quality:  Gradual  Duration:  3 days  Timing:  Constant  Progression:  Worsening  Chronicity:  Recurrent  Context: weather changes    Context comment:  Patient has known history COPD and started having increased shortness of breath 3 days ago and then it escalated prior to EMS bringing to ER  Relieved by:  Nothing  Worsened by:  Deep breathing, activity, movement, exertion, stress, weather changes and coughing  Ineffective treatments:  Rest  Associated symptoms: cough, diaphoresis and wheezing    Associated symptoms: no chest pain and no fever        Review of Systems   Constitutional: Positive for diaphoresis. Negative for fever. HENT: Negative. Eyes: Negative. Respiratory: Positive for cough, shortness of breath and wheezing. Cardiovascular: Negative. Negative for chest pain. Gastrointestinal: Negative. Genitourinary: Negative. Musculoskeletal: Negative. Skin: Negative. Neurological: Negative. All other systems reviewed and are negative. No family history on file.   Social History     Socioeconomic History    Marital status:      Spouse name: Not on file    Number of children: Not on file    Years of education: Not on file    Highest education level: Not on file   Occupational History    Not on file   Tobacco Use    Smoking status: Former Smoker     Packs/day: 0.00     Quit date: 2017     Years since quittin.9    Smokeless tobacco: Never Used   Vaping Use    Vaping Use: Never used   Substance and Sexual Activity    Alcohol use: No    Drug use: No    Sexual activity: Yes     Partners: Female   Other Topics Concern    Not on file   Social History Narrative    Not on file     Social Determinants of Health     Financial Resource Strain:     Difficulty of Paying Living Expenses: Not on file   Food Insecurity:     Worried About Running Out of Food in the Last Year: Not on file    [address] in the Last Year: Not on file   Transportation Needs:     Lack of Transportation (Medical): Not on file    Lack of Transportation (Non-Medical): Not on file   Physical Activity:     Days of Exercise per Week: Not on file    Minutes of Exercise per Session: Not on file   Stress:     Feeling of Stress : Not on file   Social Connections:     Frequency of Communication with Friends and Family: Not on file    Frequency of Social Gatherings with Friends and Family: Not on file    Attends Yazdanism Services: Not on file    Active Member of [address] [place] [address] or Organizations: Not on file    Attends Club or Organization Meetings: Not on file    Marital Status: Not on file   Intimate Partner Violence:     Fear of Current or Ex-Partner: Not on file    Emotionally Abused: Not on file    Physically Abused: Not on file    Sexually Abused: Not on file   Housing Stability:     Unable to Pay for Housing in the Last Year: Not on file    Number of [place] in the Last Year: Not on file    Unstable Housing in the Last Year: Not on file     Past Surgical History:   Procedure Laterality Date    CHOLECYSTECTOMY       Past Medical History:   Diagnosis Date    Chronic back pain     Emphysema of lung ([facility] [address]     Pneumonia      No Known Allergies  Prior to Admission medications    Medication Sig Start Date End Date Taking? Authorizing Provider   lidocaine (LIDODERM) 5 % Place 1 patch onto the skin daily 12 hours on, 12 hours off.   Patient not taking: Reported on 5/21/2022 9/12/21   Annie Myles MD   fluticasone-salmeterol (ADVAIR) 250-50 MCG/DOSE AEPB Inhale 1 puff into the lungs every 12 hours    Historical Provider, MD   ipratropium-albuterol (DUONEB) 0.5-2.5 (3) MG/3ML SOLN nebulizer solution Inhale 3 mLs into the lungs every 6 hours as needed for Shortness of Breath 10/13/19   Harjit Frank MD   albuterol sulfate  (90 BASE) MCG/ACT inhaler Inhale 2 puffs into the lungs every 6 hours as needed for Wheezing    Historical Provider, MD       BP (!) 197/186   Pulse 129   Temp 98 °F (36.7 °C) (Tympanic)   Resp 22   Ht 5' 4\" (1.626 m)   Wt 185 lb (83.9 kg)   SpO2 95%   BMI 31.76 kg/m²     Physical Exam  Vitals and nursing note reviewed. Constitutional:       General: He is in acute distress. Appearance: He is ill-appearing and diaphoretic. HENT:      Head: Normocephalic. Mouth/Throat:      Mouth: Mucous membranes are moist.   Eyes:      Pupils: Pupils are equal, round, and reactive to light. Cardiovascular:      Rate and Rhythm: Tachycardia present. Pulmonary:      Effort: Tachypnea, accessory muscle usage and respiratory distress present. Breath sounds: Decreased breath sounds and wheezing present. Abdominal:      Palpations: Abdomen is soft. Tenderness: There is no abdominal tenderness. Skin:     Capillary Refill: Capillary refill takes less than 2 seconds. Coloration: Skin is pale. Neurological:      General: No focal deficit present. Mental Status: He is alert and oriented to person, place, and time. Psychiatric:         Mood and Affect: Mood normal.         Behavior: Behavior normal.         MDM:    Labs Reviewed   CBC WITH AUTO DIFFERENTIAL - Abnormal; Notable for the following components:       Result Value    WBC 32.0 (*)     Segs Relative 85.4 (*)     Lymphocytes % 4.5 (*)     Monocytes % 8.5 (*)     Immature Neutrophil % 0.8 (*)     All other components within normal limits   COMPREHENSIVE METABOLIC PANEL - Abnormal; Notable for the following components:    CREATININE 0.8 (*)     Glucose 149 (*)     Total Bilirubin 1.4 (*)     ALT 56 (*)     Alkaline Phosphatase 152 (*)     All other components within normal limits   CULTURE, BLOOD 1   CULTURE, BLOOD 2   LIPASE   BRAIN NATRIURETIC PEPTIDE   TROPONIN   LACTIC ACID       XR CHEST PORTABLE   Final Result   No acute process.             Sinus tachycardia   Low voltage QRS   Nonspecific ST and T wave abnormality   Abnormal ECG   When compared with ECG of 27-DEC-2019 16:36,   No significant change was found     Saline lock was established and patient was given solumedrol by EMS at 125 mg IV. Patient was given breathing treatments in route. Upon arrival patient was transitioned to our BiPaP and placed on 14/7 rate 14 and 100% fiO2. Patient was then given additional duonebs and stacked albuterol nebs. 1950: Patient is rapidly improving and we will begin to ween Bipap as tolerated. Respiratory therapy will be utilizing my ween parameters. The patient triggered the sepsis alert parameters because he is tachypneic and tachycardic in addition the patient has elevated white blood cell count. Patient's tachycardia and tachypnea are most likely related to his acute COPD exacerbation secondary to the patient's current presentation and the elevated white blood cell count may be related to that of an acute phase reaction but because he triggered the sepsis protocol so I was forced to drawn blood cultures and lactic acid. He was given Rocephin and Zithromax. He does not require fluids because his blood pressure is stable and he is improved. His BiPaP has been weaned to off and he is currently on 2 liters NC. His pulse has decrease from 150's to 120's and his rate and work of breathing have improved. Oxygenation is 96% on 2 liters    Is this patient to be included in the SEP-1 Core Measure due to severe sepsis or septic shock? Yes   SEP-1 CORE MEASURE DATA      Sepsis Criteria   Severe Sepsis Criteria   Septic Shock Criteria     Must be confirmed or suspected to move forward with diagnosis of sepsis. Must meet 2:    [] Temperature > 100.9 F (38.3 C)        or < 96.8 F (36 C)  [x] HR > 90  [x] RR > 20  [x] WBC > 12 or < 4 or 10% bands      AND:      [x] Infection Confirmed or        Suspected.      Must meet 1:    [] Lactate > 2       or   [] Signs of Organ Dysfunction:    - SBP < 90 or MAP < 65  - Altered mental status  - Creatinine > 2 or increased from      baseline  - Urine Output < 0.5 ml/kg/hr  - Bilirubin > 2  - INR > 1.5 (not anticoagulated)  - Platelets < 028,883  - Acute Respiratory Failure as     evidenced by new need for NIPPV     or mechanical ventilation      [x] No criteria met for Severe Sepsis. Must meet 1:    [] Lactate > 4        or   [] SBP < 90 or MAP < 65 for at        least two readings in the first        hour after fluid bolus        administration      [] Vasopressors initiated (if hypotension persists after fluid resuscitation)        [] No criteria met for Septic Shock. Patient Vitals for the past 6 hrs:   BP Temp Pulse Resp SpO2 Height Weight Percent Weight Change   05/21/22 1847 (!) 180/129 98 °F (36.7 °C) 146 28 100 % 5' 4\" (1.626 m) -- --   05/21/22 1851 -- -- -- -- -- -- 185 lb (83.9 kg) 0   05/21/22 1858 -- -- -- (!) 31 -- -- -- --   05/21/22 1900 (!) 144/97 -- 146 (!) 34 100 % -- -- --   05/21/22 1915 126/89 -- 132 25 100 % -- -- --   05/21/22 1930 134/82 -- 138 28 100 % -- -- --   05/1945 (!) 164/84 -- 135 28 100 % -- -- --   05/21/22 1958 -- -- 134 28 99 % -- -- --   05/21/22 1959 -- -- 133 -- -- -- -- --   05/21/22 2021 (!) 197/186 -- 129 22 95 % -- -- --      Recent Labs     05/21/22 1910   WBC 32.0*   CREATININE 0.8*   BILITOT 1.4*            Time 2129 Identified: Patient lactic acid is not elevated     Fluid Resuscitation Rational: Patient is stable and improved. I am not going to fluid overload based on one reading of 104 SBP and a repeat /76 in a patient who has just urinated in the ER. Internal medicine can bolus if they believe he requires. Repeat lactate level: Will be handled by admission team. I have admitted this patient and they are managing    Reassessment Exam 2100:   Patient is stable and improved. His pulse continues to trend down to 115 and his oxygenation is 95% on 2 liters.  WOB is improved with RR 21 and no accessory muscle usage although he still has wheezing bilaterally      Critical care time of 60 minutes was given to this patient which included time at the bedside, discussions with consultants, review of the chart, and lab studies. This critical care time does not include any billable procedures. Final Impression    1. Hypoxia    2. COPD with acute exacerbation ([facility] [address]    3.  Respiratory distress              Carla Sender,   05/21/22 1735

## 2022-05-22 LAB
ALBUMIN SERPL-MCNC: 3.8 GM/DL (ref 3.4–5)
ALP BLD-CCNC: 132 IU/L (ref 40–129)
ALT SERPL-CCNC: 46 U/L (ref 10–40)
ANION GAP SERPL CALCULATED.3IONS-SCNC: 12 MMOL/L (ref 4–16)
AST SERPL-CCNC: 22 IU/L (ref 15–37)
BASOPHILS ABSOLUTE: 0.1 K/CU MM
BASOPHILS RELATIVE PERCENT: 0.2 % (ref 0–1)
BILIRUB SERPL-MCNC: 0.6 MG/DL (ref 0–1)
BUN BLDV-MCNC: 17 MG/DL (ref 6–23)
CALCIUM SERPL-MCNC: 8.5 MG/DL (ref 8.3–10.6)
CHLORIDE BLD-SCNC: 106 MMOL/L (ref 99–110)
CHOLESTEROL: 218 MG/DL
CO2: 20 MMOL/L (ref 21–32)
CREAT SERPL-MCNC: 1 MG/DL (ref 0.9–1.3)
DIFFERENTIAL TYPE: ABNORMAL
EKG ATRIAL RATE: 94 BPM
EKG DIAGNOSIS: NORMAL
EKG P AXIS: 71 DEGREES
EKG P-R INTERVAL: 178 MS
EKG Q-T INTERVAL: 406 MS
EKG QRS DURATION: 82 MS
EKG QTC CALCULATION (BAZETT): 507 MS
EKG R AXIS: 12 DEGREES
EKG T AXIS: 65 DEGREES
EKG VENTRICULAR RATE: 94 BPM
EOSINOPHILS ABSOLUTE: 0.1 K/CU MM
EOSINOPHILS RELATIVE PERCENT: 0.2 % (ref 0–3)
ESTIMATED AVERAGE GLUCOSE: 120 MG/DL
GFR AFRICAN AMERICAN: >60 ML/MIN/1.73M2
GFR NON-AFRICAN AMERICAN: >60 ML/MIN/1.73M2
GLUCOSE BLD-MCNC: 242 MG/DL (ref 70–99)
GLUCOSE BLD-MCNC: 252 MG/DL (ref 70–99)
HBA1C MFR BLD: 5.8 % (ref 4.2–6.3)
HCT VFR BLD CALC: 43.7 % (ref 42–52)
HDLC SERPL-MCNC: 66 MG/DL
HEMOGLOBIN: 14.4 GM/DL (ref 13.5–18)
IMMATURE NEUTROPHIL %: 0.8 % (ref 0–0.43)
LDL CHOLESTEROL CALCULATED: 128 MG/DL
LYMPHOCYTES ABSOLUTE: 0.6 K/CU MM
LYMPHOCYTES RELATIVE PERCENT: 2 % (ref 24–44)
MCH RBC QN AUTO: 29.1 PG (ref 27–31)
MCHC RBC AUTO-ENTMCNC: 33 % (ref 32–36)
MCV RBC AUTO: 88.3 FL (ref 78–100)
MONOCYTES ABSOLUTE: 0.6 K/CU MM
MONOCYTES RELATIVE PERCENT: 1.9 % (ref 0–4)
PDW BLD-RTO: 14.6 % (ref 11.7–14.9)
PLATELET # BLD: 228 K/CU MM (ref 140–440)
PMV BLD AUTO: 9.2 FL (ref 7.5–11.1)
POTASSIUM SERPL-SCNC: 4 MMOL/L (ref 3.5–5.1)
PROCALCITONIN: 0.12
RBC # BLD: 4.95 M/CU MM (ref 4.6–6.2)
SEGMENTED NEUTROPHILS ABSOLUTE COUNT: 28.1 K/CU MM
SEGMENTED NEUTROPHILS RELATIVE PERCENT: 94.9 % (ref 36–66)
SODIUM BLD-SCNC: 138 MMOL/L (ref 135–145)
TOTAL IMMATURE NEUTOROPHIL: 0.25 K/CU MM
TOTAL PROTEIN: 6.7 GM/DL (ref 6.4–8.2)
TRIGL SERPL-MCNC: 119 MG/DL
TSH HIGH SENSITIVITY: 1.02 UIU/ML (ref 0.27–4.2)
WBC # BLD: 29.6 K/CU MM (ref 4–10.5)

## 2022-05-22 PROCEDURE — 6370000000 HC RX 637 (ALT 250 FOR IP): Performed by: PHYSICIAN ASSISTANT

## 2022-05-22 PROCEDURE — 85025 COMPLETE CBC W/AUTO DIFF WBC: CPT

## 2022-05-22 PROCEDURE — 80053 COMPREHEN METABOLIC PANEL: CPT

## 2022-05-22 PROCEDURE — 2140000000 HC CCU INTERMEDIATE R&B

## 2022-05-22 PROCEDURE — 6360000002 HC RX W HCPCS: Performed by: NURSE PRACTITIONER

## 2022-05-22 PROCEDURE — 36415 COLL VENOUS BLD VENIPUNCTURE: CPT

## 2022-05-22 PROCEDURE — 6370000000 HC RX 637 (ALT 250 FOR IP): Performed by: HOSPITALIST

## 2022-05-22 PROCEDURE — 83036 HEMOGLOBIN GLYCOSYLATED A1C: CPT

## 2022-05-22 PROCEDURE — 87070 CULTURE OTHR SPECIMN AEROBIC: CPT

## 2022-05-22 PROCEDURE — 2700000000 HC OXYGEN THERAPY PER DAY

## 2022-05-22 PROCEDURE — 82962 GLUCOSE BLOOD TEST: CPT

## 2022-05-22 PROCEDURE — 94640 AIRWAY INHALATION TREATMENT: CPT

## 2022-05-22 PROCEDURE — 6370000000 HC RX 637 (ALT 250 FOR IP): Performed by: NURSE PRACTITIONER

## 2022-05-22 PROCEDURE — 93005 ELECTROCARDIOGRAM TRACING: CPT | Performed by: NURSE PRACTITIONER

## 2022-05-22 PROCEDURE — 87205 SMEAR GRAM STAIN: CPT

## 2022-05-22 PROCEDURE — 93010 ELECTROCARDIOGRAM REPORT: CPT | Performed by: INTERNAL MEDICINE

## 2022-05-22 PROCEDURE — 2580000003 HC RX 258: Performed by: NURSE PRACTITIONER

## 2022-05-22 RX ORDER — BENZONATATE 100 MG/1
100 CAPSULE ORAL 3 TIMES DAILY PRN
Status: DISCONTINUED | OUTPATIENT
Start: 2022-05-22 | End: 2022-05-23 | Stop reason: HOSPADM

## 2022-05-22 RX ORDER — GUAIFENESIN 600 MG/1
600 TABLET, EXTENDED RELEASE ORAL 2 TIMES DAILY
Status: DISCONTINUED | OUTPATIENT
Start: 2022-05-22 | End: 2022-05-23 | Stop reason: HOSPADM

## 2022-05-22 RX ORDER — INSULIN LISPRO 100 [IU]/ML
0-3 INJECTION, SOLUTION INTRAVENOUS; SUBCUTANEOUS NIGHTLY
Status: DISCONTINUED | OUTPATIENT
Start: 2022-05-22 | End: 2022-05-23 | Stop reason: HOSPADM

## 2022-05-22 RX ORDER — DOXYCYCLINE HYCLATE 100 MG
100 TABLET ORAL EVERY 12 HOURS SCHEDULED
Status: DISCONTINUED | OUTPATIENT
Start: 2022-05-22 | End: 2022-05-23 | Stop reason: HOSPADM

## 2022-05-22 RX ORDER — GUAIFENESIN/DEXTROMETHORPHAN 100-10MG/5
10 SYRUP ORAL EVERY 4 HOURS PRN
Status: DISCONTINUED | OUTPATIENT
Start: 2022-05-22 | End: 2022-05-23 | Stop reason: HOSPADM

## 2022-05-22 RX ORDER — INSULIN LISPRO 100 [IU]/ML
0-6 INJECTION, SOLUTION INTRAVENOUS; SUBCUTANEOUS
Status: DISCONTINUED | OUTPATIENT
Start: 2022-05-22 | End: 2022-05-23 | Stop reason: HOSPADM

## 2022-05-22 RX ORDER — DEXTROSE MONOHYDRATE 50 MG/ML
100 INJECTION, SOLUTION INTRAVENOUS PRN
Status: DISCONTINUED | OUTPATIENT
Start: 2022-05-22 | End: 2022-05-23 | Stop reason: HOSPADM

## 2022-05-22 RX ADMIN — MOMETASONE FUROATE AND FORMOTEROL FUMARATE DIHYDRATE 2 PUFF: 100; 5 AEROSOL RESPIRATORY (INHALATION) at 19:50

## 2022-05-22 RX ADMIN — METHYLPREDNISOLONE SODIUM SUCCINATE 40 MG: 40 INJECTION, POWDER, FOR SOLUTION INTRAMUSCULAR; INTRAVENOUS at 12:38

## 2022-05-22 RX ADMIN — IPRATROPIUM BROMIDE AND ALBUTEROL SULFATE 3 ML: .5; 3 SOLUTION RESPIRATORY (INHALATION) at 14:45

## 2022-05-22 RX ADMIN — GUAIFENESIN 600 MG: 600 TABLET ORAL at 20:20

## 2022-05-22 RX ADMIN — METHYLPREDNISOLONE SODIUM SUCCINATE 40 MG: 40 INJECTION, POWDER, FOR SOLUTION INTRAMUSCULAR; INTRAVENOUS at 23:43

## 2022-05-22 RX ADMIN — BENZONATATE 100 MG: 100 CAPSULE ORAL at 17:51

## 2022-05-22 RX ADMIN — INSULIN LISPRO 1 UNITS: 100 INJECTION, SOLUTION INTRAVENOUS; SUBCUTANEOUS at 20:20

## 2022-05-22 RX ADMIN — METHYLPREDNISOLONE SODIUM SUCCINATE 40 MG: 40 INJECTION, POWDER, FOR SOLUTION INTRAMUSCULAR; INTRAVENOUS at 17:51

## 2022-05-22 RX ADMIN — DOXYCYCLINE HYCLATE 100 MG: 100 TABLET, FILM COATED ORAL at 20:24

## 2022-05-22 RX ADMIN — DOXYCYCLINE HYCLATE 100 MG: 100 TABLET, FILM COATED ORAL at 12:39

## 2022-05-22 RX ADMIN — GUAIFENESIN 600 MG: 600 TABLET ORAL at 12:38

## 2022-05-22 RX ADMIN — SODIUM CHLORIDE 25 ML: 9 INJECTION, SOLUTION INTRAVENOUS at 20:19

## 2022-05-22 RX ADMIN — GUAIFENESIN SYRUP AND DEXTROMETHORPHAN 10 ML: 100; 10 SYRUP ORAL at 10:32

## 2022-05-22 RX ADMIN — SODIUM CHLORIDE: 9 INJECTION, SOLUTION INTRAVENOUS at 05:35

## 2022-05-22 RX ADMIN — SODIUM CHLORIDE: 9 INJECTION, SOLUTION INTRAVENOUS at 13:36

## 2022-05-22 RX ADMIN — IPRATROPIUM BROMIDE AND ALBUTEROL SULFATE 3 ML: .5; 3 SOLUTION RESPIRATORY (INHALATION) at 11:07

## 2022-05-22 RX ADMIN — CEFTRIAXONE SODIUM 1000 MG: 1 INJECTION, POWDER, FOR SOLUTION INTRAMUSCULAR; INTRAVENOUS at 20:19

## 2022-05-22 RX ADMIN — SODIUM CHLORIDE: 9 INJECTION, SOLUTION INTRAVENOUS at 21:20

## 2022-05-22 RX ADMIN — METHYLPREDNISOLONE SODIUM SUCCINATE 40 MG: 40 INJECTION, POWDER, FOR SOLUTION INTRAMUSCULAR; INTRAVENOUS at 05:34

## 2022-05-22 RX ADMIN — ENOXAPARIN SODIUM 40 MG: 100 INJECTION SUBCUTANEOUS at 10:07

## 2022-05-22 RX ADMIN — MOMETASONE FUROATE AND FORMOTEROL FUMARATE DIHYDRATE 2 PUFF: 100; 5 AEROSOL RESPIRATORY (INHALATION) at 07:42

## 2022-05-22 RX ADMIN — IPRATROPIUM BROMIDE AND ALBUTEROL SULFATE 3 ML: .5; 3 SOLUTION RESPIRATORY (INHALATION) at 19:46

## 2022-05-22 RX ADMIN — IPRATROPIUM BROMIDE AND ALBUTEROL SULFATE 3 ML: .5; 3 SOLUTION RESPIRATORY (INHALATION) at 07:42

## 2022-05-22 ASSESSMENT — PAIN SCALES - GENERAL: PAINLEVEL_OUTOF10: 0

## 2022-05-22 NOTE — PLAN OF CARE
Patient and spouse discussed with this RN plan to discharge home with spouse and follow up appointment with pulmonology. Patient agreeable to have follow up appointment with pulmonology and states he is agreeable to staying at hospital until \"I am feeling better and can am ready to return to work\".   Problem: Discharge Planning  Goal: Discharge to home or other facility with appropriate resources  Outcome: Progressing

## 2022-05-22 NOTE — H&P
History and Physical      This patient was seen and examined autonomously. The on call hospitalist attending  was available for questions/consultation and plan of care reviewed. Name:  Nurys Donaldson /Age/Sex: 1945  (68 y.o. male)   MRN & CSN:  [id number] & [id number] Admission Date/Time: 2022  6:45 PM   Location:   PCP: Lorraine Collet, MD       Hospital Day: 1            Assessment and Plan:   Nurys Donaldson is a 68 y.o. male who presents with  Shortness of Breath (c/o increased SOB for 3 days,he started having resp distress a hr ago)    Respiratory distress with Hypoxia may be r/t COPD ex vs PNA  Weaned from BIPAP to 2L NC in ED   Chest xray neg   CTA:  No evidence of pulmonary embolism or aortic dissection.       Mild bronchial wall thickening, which may be seen in inflammatory conditions   such as bronchitis, reactive airways disease, and smoking. Resp panel: NEG   Venous Blood gas, urine leg, urine strep and procal pending  Respiratory therapy to titrate oxygen, continuous pulse ox and tele. See bellow for IV abx, will con IV solu-medrol 40mg Q6 and will titrate to PO prednisone. SIRS- SIRS criteria has been met by >=2 of the following: Temp >30 C (100.4F) or <36C (96.8F), HR >90, RR >20 or PaCO2 <32 mmHg, WBC >12,000/mm^3 or <4,000/mm^3, or >10% bands  EKG reviewed: sinus tachy with a HR of 142. Trending trops first one neg, BNP neg  CXR reviewed: NEG  CTA see above. UA pending   -blood cultures x 2 pending   -serial lactate first one: WNL will repeat in 6 hours   -IVF hydration not started in ED, originally started on NS 30 ml per kg however lactic acid came back normal and automatic blood pressures giving false readings will resume NS at a rate of 125/hr for 24 hours. -empiric IV ABX started: Zithromax and Rocephin IV    Liable blood pressures:  Needs a bigger blood pressure cuff and check placement of cuff.   Watch trends Q4 hours for 24 hours then Q shift      Abnormal labs:  WBC 32, afebrile may be stress response, lactic acid normal, waiting on repeat although may be elevated since staring IV steroids. Awaiting labs to look for possible source of infection. Blood glucose is elevated with no know hx of DM: TSH and A1c pending  AST 56 watch trends  ALK phos is 152 will watch trends        Patient case discussed with ED provider    Patient would like to be a full code while in hospital. He would like his wife to be his medical POA. Diet No diet orders on file   DVT Prophylaxis [x] Lovenox, []  Heparin, [] SCDs, [] Ambulation  [] Long term AC   GI Prophylaxis [] PPI,  [] H2 Blocker,  [] Carafate,  [x] Diet/Tube Feeds   Code Status Full code   Disposition Admit to med surg Patient plans to return home upon discharge           History of Present Illness:     Chief Complaint: Shortness of Breath (c/o increased SOB for 3 days,he started having resp distress a hr ago)      Calvin Adame is a 68 y.o. male who presents with increased SOB x 3 days with productive cough clear sputum. His SOB became worse today without exertion, he told his wife that he needed to come to the hospital. He reports feeling much better during my exam and is no longer SOB after being given medications and oxygen in ED. He reports only past medical history of COPD and asthma and takes daily inhalers for theses DX's. He denied chest pain, SOB, abdominal pain, burring or freq. urination, lower leg swelling, fevers N/V/D or constipation last BM today. Reports previous smoker quit 5 years ago does not use home oxygen. History obtained from patient and patients wife    Ten point ROS: reviewed negative, unless as noted in above HPI. Treasure Valera Review of Systems   Constitutional: Negative. HENT: Negative. Eyes: Negative. Respiratory: Positive for cough and shortness of breath. Cardiovascular: Negative. Gastrointestinal: Negative. Genitourinary: Negative.     Musculoskeletal: Negative. Neurological: Negative. Objective:   No intake or output data in the 24 hours ending 05/21/22 2128     Vitals ( these are not to full set of vital trends see full trend in chart)  Vitals:    05/21/22 2032 05/21/22 2034 05/21/22 2035 05/21/22 2122   BP: 104/65   135/76   Pulse: 128 126     Resp: 22 27 24    Temp:       TempSrc:       SpO2: 95% 96% 96%    Weight:       Height:           Physical Exam: 05/21/22     GEN -Awake  male, sitting upright in bed , NAD. Obese body habitus. Appears given age. EYES -No scleral erythema, discharge  HENT -MM are moist.   NECK -Supple, no apparent thyromegaly or masses. RESP , no wheezes, rales or rhonchi, bilateral lobes are diminished anterior and posterior. Symmetric chest movement while on NC 2 L. C/V -S1/S2 auscultated. RRR without appreciable M/R/G. No JVD or carotid bruits. Peripheral pulses equal bilaterally and palpable. Cap refill <3 sec. No peripheral edema. GI -Abdomen is soft non distended and without significant TTP. + BS. No masses or guarding.  -No CVA/ flank tenderness  MS -No gross joint deformities. SKIN -Normal coloration, warm, dry. NEURO-Cranial nerves appear grossly intact, normal speech, no lateralizing weakness. PSYC-Awake, alert, oriented x4 person, place, time, situation,  Appropriate affect. Past Medical History:      Past Medical History:   Diagnosis Date    Chronic back pain     Emphysema of lung ([address]     Pneumonia        Past Surgical  History:    has a past surgical history that includes Cholecystectomy. Social History:    FAM HX: Reviewed he denied any past family hx.  family history is not on file.     Soc HX:   Social History     Socioeconomic History    Marital status:      Spouse name: Not on file    Number of children: Not on file    Years of education: Not on file    Highest education level: Not on file   Occupational History    Not on file   Tobacco Use    Smoking status: Former Smoker Packs/day: 0.00     Quit date: 2017     Years since quittin.9    Smokeless tobacco: Never Used   Vaping Use    Vaping Use: Never used   Substance and Sexual Activity    Alcohol use: No    Drug use: No    Sexual activity: Yes     Partners: Female   Other Topics Concern    Not on file   Social History Narrative    Not on file     Social Determinants of Health     Financial Resource Strain:     Difficulty of Paying Living Expenses: Not on file   Food Insecurity:     Worried About Running Out of Food in the Last Year: Not on file    Aiyana of Food in the Last Year: Not on file   Transportation Needs:     Lack of Transportation (Medical): Not on file    Lack of Transportation (Non-Medical): Not on file   Physical Activity:     Days of Exercise per Week: Not on file    Minutes of Exercise per Session: Not on file   Stress:     Feeling of Stress : Not on file   Social Connections:     Frequency of Communication with Friends and Family: Not on file    Frequency of Social Gatherings with Friends and Family: Not on file    Attends Orthodoxy Services: Not on file    Active Member of [address] [place] [address] or Organizations: Not on file    Attends Club or Organization Meetings: Not on file    Marital Status: Not on file   Intimate Partner Violence:     Fear of Current or Ex-Partner: Not on file    Emotionally Abused: Not on file    Physically Abused: Not on file    Sexually Abused: Not on file   Housing Stability:     Unable to Pay for Housing in the Last Year: Not on file    Number of [place] in the Last Year: Not on file    Unstable Housing in the Last Year: Not on file     TOBACCO:   reports that he quit smoking about 4 years ago. He smoked 0.00 packs per day. He has never used smokeless tobacco.  ETOH:   reports no history of alcohol use. Drugs:  reports no history of drug use.     Allergies: No Known Allergies    Home Medications:     Prior to Admission medications    Medication Sig Start Date End Date Taking? Authorizing Provider   lidocaine (LIDODERM) 5 % Place 1 patch onto the skin daily 12 hours on, 12 hours off. Patient not taking: Reported on 5/21/2022 9/12/21   Rockwell Hatchet, MD   fluticasone-salmeterol (ADVAIR) 250-50 MCG/DOSE AEPB Inhale 1 puff into the lungs every 12 hours    Historical Provider, MD   ipratropium-albuterol (DUONEB) 0.5-2.5 (3) MG/3ML SOLN nebulizer solution Inhale 3 mLs into the lungs every 6 hours as needed for Shortness of Breath 10/13/19   Annabel Alford MD   albuterol sulfate  (90 BASE) MCG/ACT inhaler Inhale 2 puffs into the lungs every 6 hours as needed for Wheezing    Historical Provider, MD         Medications:   Medications:    ipratropium-albuterol        methylPREDNISolone  60 mg IntraVENous Once    azithromycin  500 mg IntraVENous Once      Infusions:    sodium chloride       PRN Meds:      Data:     Laboratory this visit:  Reviewed  Recent Labs     05/21/22 1910   WBC 32.0*   HGB 16.1   HCT 48.9         Recent Labs     05/21/22 1910      K 4.0      CO2 26   BUN 13   CREATININE 0.8*     Recent Labs     05/21/22 1910   AST 36   ALT 56*   BILITOT 1.4*   ALKPHOS 152*     No results for input(s): INR in the last 72 hours. Radiology this visit:  Reviewed. XR CHEST PORTABLE    Result Date: 5/21/2022  EXAMINATION: ONE XRAY VIEW OF THE CHEST 5/21/2022 7:28 pm COMPARISON: 12/27/2019 HISTORY: ORDERING SYSTEM PROVIDED HISTORY: chest pain TECHNOLOGIST PROVIDED HISTORY: Reason for exam:->chest pain Reason for Exam: sob, hx COPD FINDINGS: The lungs are without acute focal process. There is no effusion or pneumothorax. The cardiomediastinal silhouette is without acute process. The osseous structures are without acute process. No acute process.            EKG this visit:  Reviewed         Electronically signed by ANA Camargo CNP on 5/21/2022 at 9:28 PM

## 2022-05-22 NOTE — PROGRESS NOTES
Pts admission skin assessment done by this RN and Aidee Singh LPN. No skin issues noted at this time.

## 2022-05-22 NOTE — PROGRESS NOTES
Patient slowly titrated off oxygen during this shift. Patient started shift on 3 liters per nasal cannula and was titrated down as patient stayed at 94-96% oxygen saturation from 3 liters on nasal cannula and remains at 95% on room air.

## 2022-05-22 NOTE — PROGRESS NOTES
V2.0  [facility] [place] [facility] [place]      Name:  Elmer Walls /Age/Sex: 1945  (68 y.o. male)   MRN & CSN:  [id number] & [id number] Encounter Date/Time: 2022 9:43 AM EDT    Location:  [facility]/[id number] PCP: Urbano Alvarado MD       Hospital Day: 2    Assessment and Plan:   Elmer Walls is a 68 y.o. male with pmh of COPD/emphysema, HTN, obesity, history pneumonia, smoker, DM, HNP lumbar, history of colon polyp, obesity, proteinuria who presents with Respiratory distress      Plan:  # Respiratory distress with Hypoxia may be r/t COPD ex vs PNA  Continue O2 per nasal cannula. Does not wear O2 at home. Chest x-ray negative. CTA pulmonary study revealing no evidence PE or aortic dissection. CTA pulmonary study does reveal evidence of bronchial wall thickening to suggest an inflammatory process and likely secondary to previous years of tobacco smoking. Respiratory panel negative. Urine Legionella and strep pending. Discontinue azithromycin and with prolonged QT start doxycycline  Continue Rocephin  Continue Solu-Medrol IV and transition to p.o. prednisone  Continue Tessalon/Robitussin-DM as needed cough  Continue Mucinex as expectorant. Continue with nebulized medication and consult respiratory.     # SIRS- SIRS criteria has been met by >=2 of the following: Temp >30 C (100.4F) or <36C (96.8F), HR >90, RR >20 or PaCO2 <32 mmHg, WBC >12,000/mm^3 or <4,000/mm^3, or >10% bands  EKG reviewed: sinus tachy with a HR of 142. Trending trops first one neg, BNP neg  CXR reviewed: NEG  CTA see above. UA pending              -blood cultures x 2 pending              -serial lactate first one: WNL will repeat in 6 hours              -IVF hydration not started in ED, originally started on NS 30 ml per kg however lactic acid came back normal and automatic blood pressures giving false readings will resume NS at a rate of 125/hr for 24 hours.               -empiric IV ABX started: Zithromax and Rocephin IV    # Leukocytosis  The patient WBC on ED admission 32.0  This morning the patient WBC 29.6. # HTN Liable blood pressures: The patient's blood pressure 113/65. Continue monitoring.     # DM    The patient's blood sugar is 252. A1c obtained at visit resulting 5.8. I believe reactive hypoglycemia. I do find history of diabetes. However, not currently treated. # Smoker   Continue smoking abstinence. Abnormal labs:  WBC 32, afebrile may be stress response, lactic acid normal, waiting on repeat although may be elevated since staring IV steroids. Awaiting labs to look for possible source of infection. Blood glucose is elevated with no know hx of DM: TSH and A1c pending  AST 56 watch trends  ALK phos is 152 will watch trends         Diet ADULT DIET; Regular; Low Fat/Low Chol/High Fiber/MELODY   DVT Prophylaxis [x] Lovenox, []  Heparin, [] SCDs, [] Ambulation,  [] Eliquis, [] Xarelto  [] Coumadin   Code Status Full Code   Disposition From: Home  Expected Disposition: Home  Estimated Date of Discharge: TBD  Patient requires continued admission due to COPD exacerbation, reactive airway disorder, possible pneumonia, shortness of breath, leukocytosis, hypoxemia requiring oxygen   Surrogate Decision Maker/ POA      Subjective:     Chief Complaint: Shortness of Breath (c/o increased SOB for 3 days,he started having resp distress a hr ago)       Ivory Ordoñez is a 68 y.o. male who presents with Ivory Ordoñez is a 68 y.o. male who presents with increased SOB x 3 days with productive cough clear sputum. His SOB became worse today without exertion, he told his wife that he needed to come to the hospital. He reports feeling much better during my exam and is no longer SOB after being given medications and oxygen in ED. He reports only past medical history of COPD and asthma and takes daily inhalers for theses DX's.  He denied chest pain, SOB, abdominal pain, burring or freq. urination, lower leg swelling, fevers N/V/D or constipation last BM today. Reports previous smoker quit 5 years ago does not use home oxygen.        History obtained from patient and patients wife            Review of Systems:    Review of Systems    A 10 point review of systems performed. Objective: Intake/Output Summary (Last 24 hours) at 5/22/2022 1821  Last data filed at 5/22/2022 1648  Gross per 24 hour   Intake 3149.81 ml   Output --   Net 3149.81 ml        Vitals:   Vitals:    05/22/22 1745   BP: 113/65   Pulse: 102   Resp: 16   Temp: 97.8 °F (36.6 °C)   SpO2: 95%       Physical Exam:     General: NAD  Eyes: EOMI  ENT: neck supple  Cardiovascular: Regular rate. Respiratory: Wheezing noted on exam  Gastrointestinal: Soft, non tender  Genitourinary: no suprapubic tenderness  Musculoskeletal: No edema  Skin: warm, dry  Neuro: Alert. Psych: Mood appropriate.      Medications:   Medications:    doxycycline hyclate  100 mg Oral 2 times per day    guaiFENesin  600 mg Oral BID    insulin lispro  0-6 Units SubCUTAneous TID WC    insulin lispro  0-3 Units SubCUTAneous Nightly    methylPREDNISolone  60 mg IntraVENous Once    ipratropium-albuterol  1 vial Inhalation 4x daily    mometasone-formoterol  2 puff Inhalation BID    sodium chloride flush  5-40 mL IntraVENous BID    enoxaparin  40 mg SubCUTAneous Daily    methylPREDNISolone  40 mg IntraVENous Q6H    Followed by   Meghana Johns ON 5/24/2022] predniSONE  40 mg Oral Daily    cefTRIAXone (ROCEPHIN) IV  1,000 mg IntraVENous Q24H      Infusions:    dextrose      sodium chloride 125 mL/hr at 05/22/22 1648    sodium chloride       PRN Meds: guaiFENesin-dextromethorphan, 10 mL, Q4H PRN  benzonatate, 100 mg, TID PRN  glucose, 4 tablet, PRN  dextrose bolus, 125 mL, PRN   Or  dextrose bolus, 250 mL, PRN  glucagon (rDNA), 1 mg, PRN  dextrose, 100 mL/hr, PRN  sodium chloride flush, 5-40 mL, PRN  sodium chloride, 25 mL, PRN  senna, 1 tablet, Daily PRN  acetaminophen, 650 mg, Q6H PRN   Or  acetaminophen, 650 mg, Q6H PRN        Labs      Recent Results (from the past 24 hour(s))   Procalcitonin    Collection Time: 05/21/22  7:00 PM   Result Value Ref Range    Procalcitonin 0.122    Hemoglobin A1C    Collection Time: 05/21/22  7:00 PM   Result Value Ref Range    Hemoglobin A1C 5.8 4.2 - 6.3 %    eAG 120 mg/dL   TSH    Collection Time: 05/21/22  7:00 PM   Result Value Ref Range    TSH, High Sensitivity 1.020 0.270 - 4.20 uIu/ml   Lipid panel    Collection Time: 05/21/22  7:00 PM   Result Value Ref Range    Triglycerides 119 <150 MG/DL    Cholesterol 218 (H) <200 MG/DL    HDL 66 >40 MG/DL    LDL Calculated 128 (H) <100 MG/DL   CBC with Auto Differential    Collection Time: 05/21/22  7:10 PM   Result Value Ref Range    WBC 32.0 (HH) 4.0 - 10.5 K/CU MM    RBC 5.61 4.6 - 6.2 M/CU MM    Hemoglobin 16.1 13.5 - 18.0 GM/DL    Hematocrit 48.9 42 - 52 %    MCV 87.2 78 - 100 FL    MCH 28.7 27 - 31 PG    MCHC 32.9 32.0 - 36.0 %    RDW 14.6 11.7 - 14.9 %    Platelets 851 945 - 070 K/CU MM    MPV 8.8 7.5 - 11.1 FL    Differential Type AUTOMATED DIFFERENTIAL     Segs Relative 85.4 (H) 36 - 66 %    Lymphocytes % 4.5 (L) 24 - 44 %    Monocytes % 8.5 (H) 0 - 4 %    Eosinophils % 0.2 0 - 3 %    Basophils % 0.6 0 - 1 %    Segs Absolute 27.3 K/CU MM    Lymphocytes Absolute 1.4 K/CU MM    Monocytes Absolute 2.7 K/CU MM    Eosinophils Absolute 0.1 K/CU MM    Basophils Absolute 0.2 K/CU MM    Immature Neutrophil % 0.8 (H) 0 - 0.43 %    Total Immature Neutrophil 0.26 K/CU MM   Comprehensive Metabolic Panel    Collection Time: 05/21/22  7:10 PM   Result Value Ref Range    Sodium 138 135 - 145 MMOL/L    Potassium 4.0 3.5 - 5.1 MMOL/L    Chloride 101 99 - 110 mMol/L    CO2 26 21 - 32 MMOL/L    BUN 13 6 - 23 MG/DL    CREATININE 0.8 (L) 0.9 - 1.3 MG/DL    Glucose 149 (H) 70 - 99 MG/DL    Calcium 8.8 8.3 - 10.6 MG/DL    Albumin 4.3 3.4 - 5.0 GM/DL    Total Protein 7.4 6.4 - 8.2 GM/DL    Total Bilirubin 1.4 (H) 0.0 - 1.0 MG/DL    ALT 56 (H) 10 - 40 U/L    AST 36 15 - 37 IU/L    Alkaline Phosphatase 152 (H) 40 - 129 IU/L    GFR Non-African American >60 >60 mL/min/1.73m2    GFR African American >60 >60 mL/min/1.73m2    Anion Gap 11 4 - 16   Lipase    Collection Time: 05/21/22  7:10 PM   Result Value Ref Range    Lipase 42 13 - 60 IU/L   Brain Natriuretic Peptide    Collection Time: 05/21/22  7:10 PM   Result Value Ref Range    Pro-BNP 61.07 <300 PG/ML   Troponin    Collection Time: 05/21/22  7:10 PM   Result Value Ref Range    Troponin T <0.010 <0.01 NG/ML   EKG 12 Lead    Collection Time: 05/21/22  7:16 PM   Result Value Ref Range    Ventricular Rate 142 BPM    Atrial Rate 142 BPM    P-R Interval 130 ms    QRS Duration 74 ms    Q-T Interval 294 ms    QTc Calculation (Bazett) 452 ms    P Axis 43 degrees    R Axis 19 degrees    T Axis 77 degrees    Diagnosis       Sinus tachycardia  Low voltage QRS  Nonspecific ST and T wave abnormality  Abnormal ECG  When compared with ECG of 27-DEC-2019 16:36,  No significant change was found     Lactic Acid    Collection Time: 05/21/22  7:50 PM   Result Value Ref Range    Lactate 0.9 0.4 - 2.0 mMOL/L   Urinalysis    Collection Time: 05/21/22  8:30 PM   Result Value Ref Range    Color, UA YELLOW (A) YELLOW    Clarity, UA CLEAR CLEAR    Glucose, Urine NEGATIVE NEGATIVE MG/DL    Bilirubin Urine NEGATIVE NEGATIVE MG/DL    Ketones, Urine NEGATIVE NEGATIVE MG/DL    Specific Gravity, UA 1.010 1.001 - 1.035    Blood, Urine NEGATIVE NEGATIVE    pH, Urine 6.5 5.0 - 8.0    Protein,  (A) NEGATIVE MG/DL    Urobilinogen, Urine 1.0 0.2 - 1.0 MG/DL    Nitrite Urine, Quantitative NEGATIVE NEGATIVE    Leukocyte Esterase, Urine NEGATIVE NEGATIVE    RBC, UA 0 0 - 3 /HPF    WBC, UA 0 0 - 2 /HPF    Epithelial Cells, UA 0 /HPF    Cast Type 0 (A) NO CAST FORMS SEEN /HPF    Bacteria, UA 0 (A) NEGATIVE /HPF    Crystal Type 0 (A) NEGATIVE /HPF   Respiratory Panel, Molecular, with COVID-19 (Restricted: peds pts or suitable admitted adults) Collection Time: 05/21/22  9:20 PM    Specimen: Nasopharyngeal   Result Value Ref Range    Adenovirus Detection by PCR NOT DETECTED NOT DETECTED    Coronavirus 229E PCR NOT DETECTED NOT DETECTED    Coronavirus HKU1 PCR NOT DETECTED NOT DETECTED    Coronavirus NL63 PCR NOT DETECTED NOT DETECTED    Coronavirus OC43 PCR NOT DETECTED NOT DETECTED    SARS-CoV-2 NOT DETECTED NOT DETECTED    Human Metapneumovirus PCR NOT DETECTED NOT DETECTED    Rhinovirus Enterovirus PCR NOT DETECTED NOT DETECTED    Influenza A by PCR NOT DETECTED NOT DETECTED    Influenza A H1 Pandemic PCR NOT DETECTED NOT DETECTED    Influenza A H1 (2009) PCR NOT DETECTED NOT DETECTED    Influenza A H3 PCR NOT DETECTED NOT DETECTED    Influenza B by PCR NOT DETECTED NOT DETECTED    Parainfluenza 1 PCR NOT DETECTED NOT DETECTED    Parainfluenza 2 PCR NOT DETECTED NOT DETECTED    Parainfluenza 3 PCR NOT DETECTED NOT DETECTED    Parainfluenza 4 PCR NOT DETECTED NOT DETECTED    RSV PCR NOT DETECTED NOT DETECTED    Bordetella parapertussis by PCR NOT DETECTED NOT DETECTED    B Pertussis by PCR NOT DETECTED NOT DETECTED    Chlamydophila Pneumonia PCR NOT DETECTED NOT DETECTED    Mycoplasma pneumo by PCR NOT DETECTED NOT DETECTED   EKG 12 Lead    Collection Time: 05/22/22  5:21 AM   Result Value Ref Range    Ventricular Rate 94 BPM    Atrial Rate 94 BPM    P-R Interval 178 ms    QRS Duration 82 ms    Q-T Interval 406 ms    QTc Calculation (Bazett) 507 ms    P Axis 71 degrees    R Axis 12 degrees    T Axis 65 degrees    Diagnosis       Sinus rhythm with premature atrial complexes  Low voltage QRS  Inferior infarct , age undetermined  Prolonged QT  Abnormal ECG  When compared with ECG of 21-MAY-2022 19:16,  premature atrial complexes are now present  Vent.  rate has decreased BY  48 BPM  Inferior infarct is now present  Confirmed by Mariam Burnham [id number] ([id number]) on 5/22/2022 3:09:25 PM     Comprehensive Metabolic Panel w/ Reflex to MG    Collection Time: 05/22/22 6:00 AM   Result Value Ref Range    Sodium 138 135 - 145 MMOL/L    Potassium 4.0 3.5 - 5.1 MMOL/L    Chloride 106 99 - 110 mMol/L    CO2 20 (L) 21 - 32 MMOL/L    BUN 17 6 - 23 MG/DL    CREATININE 1.0 0.9 - 1.3 MG/DL    Glucose 252 (H) 70 - 99 MG/DL    Calcium 8.5 8.3 - 10.6 MG/DL    Albumin 3.8 3.4 - 5.0 GM/DL    Total Protein 6.7 6.4 - 8.2 GM/DL    Total Bilirubin 0.6 0.0 - 1.0 MG/DL    ALT 46 (H) 10 - 40 U/L    AST 22 15 - 37 IU/L    Alkaline Phosphatase 132 (H) 40 - 129 IU/L    GFR Non-African American >60 >60 mL/min/1.73m2    GFR African American >60 >60 mL/min/1.73m2    Anion Gap 12 4 - 16   CBC with Auto Differential    Collection Time: 05/22/22  6:00 AM   Result Value Ref Range    WBC 29.6 (H) 4.0 - 10.5 K/CU MM    RBC 4.95 4.6 - 6.2 M/CU MM    Hemoglobin 14.4 13.5 - 18.0 GM/DL    Hematocrit 43.7 42 - 52 %    MCV 88.3 78 - 100 FL    MCH 29.1 27 - 31 PG    MCHC 33.0 32.0 - 36.0 %    RDW 14.6 11.7 - 14.9 %    Platelets 010 529 - 117 K/CU MM    MPV 9.2 7.5 - 11.1 FL    Differential Type AUTOMATED DIFFERENTIAL     Segs Relative 94.9 (H) 36 - 66 %    Lymphocytes % 2.0 (L) 24 - 44 %    Monocytes % 1.9 0 - 4 %    Eosinophils % 0.2 0 - 3 %    Basophils % 0.2 0 - 1 %    Segs Absolute 28.1 K/CU MM    Lymphocytes Absolute 0.6 K/CU MM    Monocytes Absolute 0.6 K/CU MM    Eosinophils Absolute 0.1 K/CU MM    Basophils Absolute 0.1 K/CU MM    Immature Neutrophil % 0.8 (H) 0 - 0.43 %    Total Immature Neutrophil 0.25 K/CU MM        Imaging/Diagnostics Last 24 Hours   XR CHEST PORTABLE    Result Date: 5/21/2022  EXAMINATION: ONE XRAY VIEW OF THE CHEST 5/21/2022 7:28 pm COMPARISON: 12/27/2019 HISTORY: ORDERING SYSTEM PROVIDED HISTORY: chest pain TECHNOLOGIST PROVIDED HISTORY: Reason for exam:->chest pain Reason for Exam: sob, hx COPD FINDINGS: The lungs are without acute focal process. There is no effusion or pneumothorax. The cardiomediastinal silhouette is without acute process.  The osseous structures are without acute process. No acute process. CTA PULMONARY W CONTRAST    Result Date: 5/21/2022  EXAMINATION: CTA OF THE CHEST 5/21/2022 5:56 pm TECHNIQUE: CTA of the chest was performed after the administration of intravenous contrast.  Multiplanar reformatted images are provided for review. MIP images are provided for review. Automated exposure control, iterative reconstruction, and/or weight based adjustment of the mA/kV was utilized to reduce the radiation dose to as low as reasonably achievable. COMPARISON: 12/27/2019 HISTORY: ORDERING SYSTEM PROVIDED HISTORY: sob TECHNOLOGIST PROVIDED HISTORY: Reason for exam:->sob Decision Support Exception - unselect if not a suspected or confirmed emergency medical condition->Emergency Medical Condition (MA) Reason for Exam: sob FINDINGS: Pulmonary Arteries: Pulmonary arteries are adequately opacified for evaluation. No evidence of intraluminal filling defect to suggest pulmonary embolism. Main pulmonary artery is normal in caliber. Mediastinum: Visualized thyroid unremarkable for age. Shotty borderline enlarged lymph nodes are seen, similar when compared to the previous exam, likely reactive. Esophagus unremarkable. Cardiac chambers unremarkable. No pericardial effusion. Thoracic aorta normal in caliber without evidence of dissection. Lungs/pleura: Multiple calcified granulomas are seen. Punctate 2-3 mm nodule within the medial aspect of the left upper lobe is unchanged since 2019 and considered benign, requiring no specific follow-up (4, 90). Diffuse mild bronchial wall thickening is noted. No suspicious lung lesions are detected. No acute infiltrate. Emphysema noted. Upper Abdomen: Moderate diffuse hepatic steatosis noted. Nodularity of the adrenal glands is again seen, unchanged, also requiring no specific follow-up. Soft Tissues/Bones: Visualized extra thoracic soft tissues unremarkable. No acute bony abnormalities are identified.      No evidence of pulmonary embolism or aortic dissection. Mild bronchial wall thickening, which may be seen in inflammatory conditions such as bronchitis, reactive airways disease, and smoking.        Electronically signed by Freddy Durand PA-C on 5/22/2022 at 6:21 PM

## 2022-05-23 VITALS
BODY MASS INDEX: 31.19 KG/M2 | TEMPERATURE: 98 F | WEIGHT: 182.7 LBS | HEART RATE: 103 BPM | RESPIRATION RATE: 18 BRPM | DIASTOLIC BLOOD PRESSURE: 75 MMHG | OXYGEN SATURATION: 94 % | SYSTOLIC BLOOD PRESSURE: 117 MMHG | HEIGHT: 64 IN

## 2022-05-23 LAB
ANION GAP SERPL CALCULATED.3IONS-SCNC: 11 MMOL/L (ref 4–16)
BASOPHILS ABSOLUTE: 0 K/CU MM
BASOPHILS RELATIVE PERCENT: 0.1 % (ref 0–1)
BUN BLDV-MCNC: 19 MG/DL (ref 6–23)
CALCIUM SERPL-MCNC: 8.7 MG/DL (ref 8.3–10.6)
CHLORIDE BLD-SCNC: 111 MMOL/L (ref 99–110)
CO2: 22 MMOL/L (ref 21–32)
CREAT SERPL-MCNC: 0.8 MG/DL (ref 0.9–1.3)
DIFFERENTIAL TYPE: ABNORMAL
EOSINOPHILS ABSOLUTE: 0 K/CU MM
EOSINOPHILS RELATIVE PERCENT: 0 % (ref 0–3)
ESTIMATED AVERAGE GLUCOSE: 117 MG/DL
GFR AFRICAN AMERICAN: >60 ML/MIN/1.73M2
GFR NON-AFRICAN AMERICAN: >60 ML/MIN/1.73M2
GLUCOSE BLD-MCNC: 153 MG/DL (ref 70–99)
GLUCOSE BLD-MCNC: 175 MG/DL (ref 70–99)
HBA1C MFR BLD: 5.7 % (ref 4.2–6.3)
HCT VFR BLD CALC: 43.3 % (ref 42–52)
HEMOGLOBIN: 14.1 GM/DL (ref 13.5–18)
IMMATURE NEUTROPHIL %: 0.9 % (ref 0–0.43)
LEGIONELLA URINARY AG: NEGATIVE
LYMPHOCYTES ABSOLUTE: 0.7 K/CU MM
LYMPHOCYTES RELATIVE PERCENT: 2.2 % (ref 24–44)
MCH RBC QN AUTO: 28.9 PG (ref 27–31)
MCHC RBC AUTO-ENTMCNC: 32.6 % (ref 32–36)
MCV RBC AUTO: 88.7 FL (ref 78–100)
MONOCYTES ABSOLUTE: 0.7 K/CU MM
MONOCYTES RELATIVE PERCENT: 2.2 % (ref 0–4)
PDW BLD-RTO: 15.1 % (ref 11.7–14.9)
PLATELET # BLD: 274 K/CU MM (ref 140–440)
PMV BLD AUTO: 9.5 FL (ref 7.5–11.1)
POTASSIUM SERPL-SCNC: 4.7 MMOL/L (ref 3.5–5.1)
RBC # BLD: 4.88 M/CU MM (ref 4.6–6.2)
SEGMENTED NEUTROPHILS ABSOLUTE COUNT: 28 K/CU MM
SEGMENTED NEUTROPHILS RELATIVE PERCENT: 94.6 % (ref 36–66)
SODIUM BLD-SCNC: 144 MMOL/L (ref 135–145)
STREP PNEUMONIAE ANTIGEN: NORMAL
TOTAL IMMATURE NEUTOROPHIL: 0.27 K/CU MM
WBC # BLD: 29.6 K/CU MM (ref 4–10.5)

## 2022-05-23 PROCEDURE — 82962 GLUCOSE BLOOD TEST: CPT

## 2022-05-23 PROCEDURE — 2580000003 HC RX 258: Performed by: NURSE PRACTITIONER

## 2022-05-23 PROCEDURE — 94640 AIRWAY INHALATION TREATMENT: CPT

## 2022-05-23 PROCEDURE — 6360000002 HC RX W HCPCS: Performed by: NURSE PRACTITIONER

## 2022-05-23 PROCEDURE — 85025 COMPLETE CBC W/AUTO DIFF WBC: CPT

## 2022-05-23 PROCEDURE — 36415 COLL VENOUS BLD VENIPUNCTURE: CPT

## 2022-05-23 PROCEDURE — 6370000000 HC RX 637 (ALT 250 FOR IP): Performed by: PHYSICIAN ASSISTANT

## 2022-05-23 PROCEDURE — 6370000000 HC RX 637 (ALT 250 FOR IP): Performed by: NURSE PRACTITIONER

## 2022-05-23 PROCEDURE — 80048 BASIC METABOLIC PNL TOTAL CA: CPT

## 2022-05-23 RX ORDER — ALBUTEROL SULFATE 90 UG/1
1-2 AEROSOL, METERED RESPIRATORY (INHALATION) EVERY 6 HOURS PRN
Qty: 18 G | Refills: 1 | Status: SHIPPED | OUTPATIENT
Start: 2022-05-23

## 2022-05-23 RX ORDER — BUDESONIDE AND FORMOTEROL FUMARATE DIHYDRATE 80; 4.5 UG/1; UG/1
2 AEROSOL RESPIRATORY (INHALATION) 2 TIMES DAILY
Status: DISCONTINUED | OUTPATIENT
Start: 2022-05-23 | End: 2022-05-23 | Stop reason: HOSPADM

## 2022-05-23 RX ORDER — DOXYCYCLINE HYCLATE 100 MG
100 TABLET ORAL 2 TIMES DAILY
Qty: 14 TABLET | Refills: 0 | Status: SHIPPED | OUTPATIENT
Start: 2022-05-23 | End: 2022-05-30

## 2022-05-23 RX ORDER — PREDNISONE 10 MG/1
TABLET ORAL
Qty: 18 TABLET | Refills: 0 | Status: SHIPPED | OUTPATIENT
Start: 2022-05-23 | End: 2022-06-01 | Stop reason: ALTCHOICE

## 2022-05-23 RX ADMIN — IPRATROPIUM BROMIDE AND ALBUTEROL SULFATE 3 ML: .5; 3 SOLUTION RESPIRATORY (INHALATION) at 07:09

## 2022-05-23 RX ADMIN — MOMETASONE FUROATE AND FORMOTEROL FUMARATE DIHYDRATE 2 PUFF: 100; 5 AEROSOL RESPIRATORY (INHALATION) at 07:09

## 2022-05-23 RX ADMIN — SODIUM CHLORIDE, PRESERVATIVE FREE 10 ML: 5 INJECTION INTRAVENOUS at 08:50

## 2022-05-23 RX ADMIN — METHYLPREDNISOLONE SODIUM SUCCINATE 40 MG: 40 INJECTION, POWDER, FOR SOLUTION INTRAMUSCULAR; INTRAVENOUS at 05:29

## 2022-05-23 RX ADMIN — ENOXAPARIN SODIUM 40 MG: 100 INJECTION SUBCUTANEOUS at 08:51

## 2022-05-23 RX ADMIN — DOXYCYCLINE HYCLATE 100 MG: 100 TABLET, FILM COATED ORAL at 08:49

## 2022-05-23 RX ADMIN — GUAIFENESIN 600 MG: 600 TABLET ORAL at 08:49

## 2022-05-23 RX ADMIN — INSULIN LISPRO 1 UNITS: 100 INJECTION, SOLUTION INTRAVENOUS; SUBCUTANEOUS at 08:49

## 2022-05-23 ASSESSMENT — PAIN SCALES - GENERAL
PAINLEVEL_OUTOF10: 0

## 2022-05-23 NOTE — CARE COORDINATION
CM met with the patient for discharge planning, patient sitting on the edge of the bed resting quietly. Patient lives at home with his wife, has insurance with Rx coverage & PCP, stated that he is independent with ADL's, is still driving, and is still working. Patient stated that he does not require the use of any assistive devices or home oxygen but does use an inhaler and nebulizer machine. Patient stated that he feels much better and plans to be discharged today and will be \"back to work tomorrow\". Patient is unable to identify any needs at this time. CM available if needs arise.

## 2022-05-23 NOTE — DISCHARGE SUMMARY
V2.0  Discharge Summary    Name:  Marleny Dye /Age/Sex: 1945 (40 y.o. male)   Admit Date: 2022  Discharge Date: 22    MRN & CSN:  [id number] & [id number] Encounter Date and Time 22 11:15 AM EDT    Attending:  Jay Marie MD Discharging Provider: Jun Mccoy [facility] Course:     Brief HPI: Marleny Dye is a 68 y.o. male who presented with acute shortness of breath/respiratory distress. Brought in by EMS. EMS placed his on BiPAP. After ED initial evaluation and treatment gradually weaned down to O2 nasal cannula which remained down for about 36 hours. The patient off oxygen for approximately 36+ hours and feeling improved requesting discharged home. Brief Problem Based Course:   # Respiratory distress/COPD exacerbation, hypoxemia  Patient initially presenting on BiPAP and weaned to nasal cannula. Off oxygen for 36 hours. The patient initially treated with oxygen, Solu-Medrol, Rocephin, azithromycin and fluids. Due to QTC azithromycin discontinued and transition to doxycycline. At discharge Rocephin discontinued and will discharge with doxycycline 100 mg twice daily 1 additional week. The Solu-Medrol was transitioned to oral prednisone. And will discharge with prednisone beginning 30 mg and tapering over 9 days. He was on the Symbicort inhaler and does have Advair at home. Patient does have MDI at home. I have refilled with spacer. Patient will utilizes DuoNeb via nebulizer at home. He will continue smoking abstinence. Chest x-ray negative. CT scan of chest finding no evidence PE, pneumonia, nodule or lesion. Did show evidence of reactive airway or inflammatory changes and no mucous plugging. Prior to discharge the patient did ambulate up and down the hallway. Slightly out of breath and his oximetry remaining at 95%. # SIRS  The patient did initially meet SIRS criteria. This has resolved.      # Leukocytosis  The patient WBC on ED admission 32.0  This morning the patient WBC 29.6. I am uncertain as to the significance of the leukocytosis. This could be COPD stress induction vs steroid induction. I have asked the patient to follow-up with PCP for repeat CBC and if continuing with leukocytosis recommended hematologic follow-up. The patient understanding. This was placed in discharge instructions. # HTN Liable blood pressures: This was initial concern. The patient's blood pressure remained in the 100 teens. At discharge 117/75. Pulse 103. We will follow with his PCP. No history of hypertension and not on antihypertensive. # DM    The patient's blood sugar initially 149. [place] 252 and at discharge 175. A1c 5.8. Not currently on diabetic regimen. I discussed diet and activity. He does state that he could do better with regard to his diet. I will defer to PCP for further assessment and management. # Smoker   Continue smoking abstinence. The patient expressed appropriate understanding of, and agreement with the discharge recommendations, medications, and plan.      Consults this admission:  None    Discharge Diagnosis:   COPD with acute exacerbation not requiring oxygen  Leukocytosis  Hyperglycemia  Former smoker    Discharge Instruction:   Follow up appointments: Pulmonary medicine Dr. Russell Griggs June 1, 2022 at 1:15 PM  Primary care physician: Costella Simmonds, MD within 2 weeks  Diet: regular diet and diabetic diet   Activity: activity as tolerated  Disposition: Discharged to:   [x]Home, []C, []SNF, []Acute Rehab, []Hospice   Condition on discharge: Stable  Labs and Tests to be Followed up as an outpatient by PCP or Specialist    Discharge Medications:        Medication List        START taking these medications      doxycycline hyclate 100 MG tablet  Commonly known as: VIBRA-TABS  Take 1 tablet by mouth 2 times daily for 7 days     predniSONE 10 MG tablet  Commonly known as: DELTASONE  3 tabs po qam for 3 days then 2 tabs qam for 3 days the 1 tab qam for 3 days            CHANGE how you take these medications      albuterol sulfate  (90 Base) MCG/ACT inhaler  Inhale 1-2 puffs into the lungs every 6 hours as needed for Wheezing or Shortness of Breath  What changed:   how much to take  reasons to take this            CONTINUE taking these medications      fluticasone-salmeterol 250-50 MCG/DOSE Aepb  Commonly known as: ADVAIR     guaiFENesin 400 MG tablet     ipratropium-albuterol 0.5-2.5 (3) MG/3ML Soln nebulizer solution  Commonly known as: DUONEB  Inhale 3 mLs into the lungs every 6 hours as needed for Shortness of Breath     therapeutic multivitamin-minerals tablet            STOP taking these medications      lidocaine 5 %  Commonly known as: Lidoderm               Where to Get Your Medications        These medications were sent to The [address] [id number]      Phone: [phone]   albuterol sulfate  (90 Base) MCG/ACT inhaler  doxycycline hyclate 100 MG tablet  predniSONE 10 MG tablet        Objective Findings at Discharge:   /75   Pulse 103   Temp 98 °F (36.7 °C) (Infrared)   Resp 18   Ht 5' 4\" (1.626 m)   Wt 182 lb 11.2 oz (82.9 kg)   SpO2 94%   BMI 31.36 kg/m²       Physical Exam:   General: NAD  Eyes: EOMI  ENT: neck supple  Cardiovascular: Regular rate. Respiratory: Slight occasional wheeze appreciated. Patient ambulates without difficulty. Gastrointestinal: Soft, non tender  Genitourinary: no suprapubic tenderness  Musculoskeletal: No edema  Skin: warm, dry  Neuro: Alert. Psych: Mood appropriate.          Labs and Imaging   XR CHEST PORTABLE    Result Date: 5/21/2022  EXAMINATION: ONE XRAY VIEW OF THE CHEST 5/21/2022 7:28 pm COMPARISON: 12/27/2019 HISTORY: ORDERING SYSTEM PROVIDED HISTORY: chest pain TECHNOLOGIST PROVIDED HISTORY: Reason for exam:->chest pain Reason for Exam: sob, hx COPD FINDINGS: The lungs are without acute focal process. There is no effusion or pneumothorax. The cardiomediastinal silhouette is without acute process. The osseous structures are without acute process. No acute process. CTA PULMONARY W CONTRAST    Result Date: 5/21/2022  EXAMINATION: CTA OF THE CHEST 5/21/2022 5:56 pm TECHNIQUE: CTA of the chest was performed after the administration of intravenous contrast.  Multiplanar reformatted images are provided for review. MIP images are provided for review. Automated exposure control, iterative reconstruction, and/or weight based adjustment of the mA/kV was utilized to reduce the radiation dose to as low as reasonably achievable. COMPARISON: 12/27/2019 HISTORY: ORDERING SYSTEM PROVIDED HISTORY: sob TECHNOLOGIST PROVIDED HISTORY: Reason for exam:->sob Decision Support Exception - unselect if not a suspected or confirmed emergency medical condition->Emergency Medical Condition (MA) Reason for Exam: sob FINDINGS: Pulmonary Arteries: Pulmonary arteries are adequately opacified for evaluation. No evidence of intraluminal filling defect to suggest pulmonary embolism. Main pulmonary artery is normal in caliber. Mediastinum: Visualized thyroid unremarkable for age. Shotty borderline enlarged lymph nodes are seen, similar when compared to the previous exam, likely reactive. Esophagus unremarkable. Cardiac chambers unremarkable. No pericardial effusion. Thoracic aorta normal in caliber without evidence of dissection. Lungs/pleura: Multiple calcified granulomas are seen. Punctate 2-3 mm nodule within the medial aspect of the left upper lobe is unchanged since 2019 and considered benign, requiring no specific follow-up (4, 90). Diffuse mild bronchial wall thickening is noted. No suspicious lung lesions are detected. No acute infiltrate. Emphysema noted. Upper Abdomen: Moderate diffuse hepatic steatosis noted.   Nodularity of the adrenal glands is again seen, unchanged, also requiring no specific follow-up. Soft Tissues/Bones: Visualized extra thoracic soft tissues unremarkable. No acute bony abnormalities are identified. No evidence of pulmonary embolism or aortic dissection. Mild bronchial wall thickening, which may be seen in inflammatory conditions such as bronchitis, reactive airways disease, and smoking. CBC:   Recent Labs     05/21/22 1910 05/22/22  0600 05/23/22  0824   WBC 32.0* 29.6* 29.6*   HGB 16.1 14.4 14.1    228 274     BMP:    Recent Labs     05/21/22 1910 05/22/22  0600 05/23/22  0824    138 144   K 4.0 4.0 4.7    106 111*   CO2 26 20* 22   BUN 13 17 19   CREATININE 0.8* 1.0 0.8*   GLUCOSE 149* 252* 175*     Hepatic:   Recent Labs     05/21/22 1910 05/22/22  0600   AST 36 22   ALT 56* 46*   BILITOT 1.4* 0.6   ALKPHOS 152* 132*     Lipids:   Lab Results   Component Value Date    CHOL 218 05/21/2022    HDL 66 05/21/2022    TRIG 119 05/21/2022     Hemoglobin A1C:   Lab Results   Component Value Date    LABA1C 5.8 05/21/2022     TSH: No results found for: TSH  Troponin:   Lab Results   Component Value Date    TROPONINT <0.010 05/21/2022    TROPONINT <0.010 12/27/2019    TROPONINT <0.010 10/13/2019     Lactic Acid: No results for input(s): LACTA in the last 72 hours.   BNP:   Recent Labs     05/21/22 1910   PROBNP 61.07     UA:  Lab Results   Component Value Date    NITRU NEGATIVE 05/21/2022    COLORU YELLOW 05/21/2022    WBCUA 0 05/21/2022    RBCUA 0 05/21/2022    BACTERIA 0 05/21/2022    CLARITYU CLEAR 05/21/2022    SPECGRAV 1.010 05/21/2022    LEUKOCYTESUR NEGATIVE 05/21/2022    UROBILINOGEN 1.0 05/21/2022    BILIRUBINUR NEGATIVE 05/21/2022    BLOODU NEGATIVE 05/21/2022    KETUA NEGATIVE 05/21/2022     Urine Cultures: No results found for: LABURIN  Blood Cultures: No results found for: BC  No results found for: BLOODCULT2  Organism: No results found for: ORG    Time Spent Discharging patient 40 minutes    Electronically signed by Hollie Grissom, none

## 2022-05-23 NOTE — PLAN OF CARE
Problem: Pain  Goal: Verbalizes/displays adequate comfort level or baseline comfort level  5/22/2022 2226 by Carlee Sharma RN  Outcome: Progressing  5/22/2022 1449 by Carin Najjar, RN  Outcome: Progressing     Problem: Discharge Planning  Goal: Discharge to home or other facility with appropriate resources  5/22/2022 2226 by Carlee Sharma RN  Outcome: Progressing  5/22/2022 1449 by Carin Najjar, RN  Outcome: Progressing     Problem: Chronic Conditions and Co-morbidities  Goal: Patient's chronic conditions and co-morbidity symptoms are monitored and maintained or improved  5/22/2022 2226 by Carlee Sharma RN  Outcome: Progressing  5/22/2022 1449 by Carin Najjar, RN  Outcome: Progressing

## 2022-05-26 LAB
CULTURE: NORMAL
GRAM SMEAR: NORMAL
Lab: NORMAL
SPECIMEN: NORMAL

## 2022-06-01 LAB
EKG ATRIAL RATE: 142 BPM
EKG DIAGNOSIS: NORMAL
EKG P AXIS: 43 DEGREES
EKG P-R INTERVAL: 130 MS
EKG Q-T INTERVAL: 294 MS
EKG QRS DURATION: 74 MS
EKG QTC CALCULATION (BAZETT): 452 MS
EKG R AXIS: 19 DEGREES
EKG T AXIS: 77 DEGREES
EKG VENTRICULAR RATE: 142 BPM

## 2022-07-18 ENCOUNTER — HOSPITAL ENCOUNTER (OUTPATIENT)
Dept: CARDIAC REHAB | Age: 77
Discharge: HOME | End: 2022-07-18
Payer: MEDICARE

## 2022-07-18 PROCEDURE — 94727 GAS DIL/WSHOT DETER LNG VOL: CPT

## 2022-07-18 PROCEDURE — 94060 EVALUATION OF WHEEZING: CPT

## 2022-07-18 PROCEDURE — 94729 DIFFUSING CAPACITY: CPT

## 2023-11-18 ENCOUNTER — HOSPITAL ENCOUNTER (INPATIENT)
Age: 78
LOS: 1 days | Discharge: HOME | DRG: 190 | End: 2023-11-19
Attending: EMERGENCY MEDICINE | Admitting: STUDENT IN AN ORGANIZED HEALTH CARE EDUCATION/TRAINING PROGRAM
Payer: MEDICARE

## 2023-11-18 ENCOUNTER — APPOINTMENT (OUTPATIENT)
Dept: GENERAL RADIOLOGY | Age: 78
DRG: 190 | End: 2023-11-18
Payer: MEDICARE

## 2023-11-18 DIAGNOSIS — R09.02 HYPOXIA: ICD-10-CM

## 2023-11-18 DIAGNOSIS — J44.1 COPD EXACERBATION (HCC): Primary | ICD-10-CM

## 2023-11-18 LAB
ANION GAP SERPL CALCULATED.3IONS-SCNC: 14 MMOL/L (ref 4–16)
B PARAP IS1001 DNA NPH QL NAA+NON-PROBE: NOT DETECTED
B PERT.PT PRMT NPH QL NAA+NON-PROBE: NOT DETECTED
BANDED NEUTROPHILS ABSOLUTE COUNT: 0.71 K/CU MM
BANDED NEUTROPHILS RELATIVE PERCENT: 4 % (ref 5–11)
BASE EXCESS MIXED: 3.8 (ref 0–1.2)
BASE EXCESS: ABNORMAL (ref 0–3.3)
BILIRUBIN URINE: NEGATIVE MG/DL
BLOOD, URINE: NEGATIVE
BUN SERPL-MCNC: 16 MG/DL (ref 6–23)
C PNEUM DNA NPH QL NAA+NON-PROBE: NOT DETECTED
CALCIUM SERPL-MCNC: 8.7 MG/DL (ref 8.3–10.6)
CHLORIDE BLD-SCNC: 105 MMOL/L (ref 99–110)
CHOLEST SERPL-MCNC: 198 MG/DL
CLARITY: CLEAR
CO2 CONTENT: 22.9 MMOL/L (ref 19–24)
CO2: 20 MMOL/L (ref 21–32)
COLOR: YELLOW
COMMENT UA: ABNORMAL
CREAT SERPL-MCNC: 0.8 MG/DL (ref 0.9–1.3)
D DIMER: 0.53 UG/ML (FEU)
DIFFERENTIAL TYPE: ABNORMAL
EKG ATRIAL RATE: 113 BPM
EKG DIAGNOSIS: NORMAL
EKG P AXIS: 55 DEGREES
EKG P-R INTERVAL: 172 MS
EKG Q-T INTERVAL: 352 MS
EKG QRS DURATION: 90 MS
EKG QTC CALCULATION (BAZETT): 482 MS
EKG R AXIS: -35 DEGREES
EKG T AXIS: 74 DEGREES
EKG VENTRICULAR RATE: 113 BPM
FLUAV H1 2009 PAN RNA NPH NAA+NON-PROBE: NOT DETECTED
FLUAV H1 RNA NPH QL NAA+NON-PROBE: NOT DETECTED
FLUAV H3 RNA NPH QL NAA+NON-PROBE: NOT DETECTED
FLUAV RNA NPH QL NAA+NON-PROBE: NOT DETECTED
FLUBV RNA NPH QL NAA+NON-PROBE: NOT DETECTED
GFR SERPL CREATININE-BSD FRML MDRD: >60 ML/MIN/1.73M2
GLUCOSE SERPL-MCNC: 122 MG/DL (ref 70–99)
GLUCOSE, URINE: 250 MG/DL
HADV DNA NPH QL NAA+NON-PROBE: NOT DETECTED
HCO3 VENOUS: 21.7 MMOL/L (ref 19–25)
HCOV 229E RNA NPH QL NAA+NON-PROBE: NOT DETECTED
HCOV HKU1 RNA NPH QL NAA+NON-PROBE: NOT DETECTED
HCOV NL63 RNA NPH QL NAA+NON-PROBE: NOT DETECTED
HCOV OC43 RNA NPH QL NAA+NON-PROBE: NOT DETECTED
HCT VFR BLD CALC: 47.6 % (ref 42–52)
HDLC SERPL-MCNC: 64 MG/DL
HEMOGLOBIN: 15.4 GM/DL (ref 13.5–18)
HMPV RNA NPH QL NAA+NON-PROBE: NOT DETECTED
HPIV1 RNA NPH QL NAA+NON-PROBE: NOT DETECTED
HPIV2 RNA NPH QL NAA+NON-PROBE: NOT DETECTED
HPIV3 RNA NPH QL NAA+NON-PROBE: NOT DETECTED
HPIV4 RNA NPH QL NAA+NON-PROBE: ABNORMAL
INFLUENZA A ANTIGEN: NOT DETECTED
INFLUENZA B ANTIGEN: NOT DETECTED
KETONES, URINE: ABNORMAL MG/DL
LDLC SERPL CALC-MCNC: 116 MG/DL
LEUKOCYTE ESTERASE, URINE: NEGATIVE
LYMPHOCYTES ABSOLUTE: 0.7 K/CU MM
LYMPHOCYTES RELATIVE PERCENT: 4 % (ref 24–44)
M PNEUMO DNA NPH QL NAA+NON-PROBE: NOT DETECTED
MCH RBC QN AUTO: 28.4 PG (ref 27–31)
MCHC RBC AUTO-ENTMCNC: 32.4 % (ref 32–36)
MCV RBC AUTO: 87.7 FL (ref 78–100)
MONOCYTES ABSOLUTE: 0.5 K/CU MM
MONOCYTES RELATIVE PERCENT: 3 % (ref 0–4)
NITRITE URINE, QUANTITATIVE: NEGATIVE
O2 SAT, VEN: 97.9 % (ref 50–70)
PCO2, VEN: 40.1 MMHG (ref 38–52)
PDW BLD-RTO: 14.1 % (ref 11.7–14.9)
PH VENOUS: 7.34 (ref 7.32–7.42)
PH VENOUS: 7.34 (ref 7.32–7.42)
PH, URINE: 6 (ref 5–8)
PLATELET # BLD: 207 K/CU MM (ref 140–440)
PMV BLD AUTO: 9 FL (ref 7.5–11.1)
PO2, VEN: 107.7 MMHG (ref 28–48)
POTASSIUM SERPL-SCNC: 4.1 MMOL/L (ref 3.5–5.1)
PRO-BNP: <36 PG/ML
PROCALCITONIN SERPL-MCNC: 0.07 NG/ML
PROTEIN UA: NEGATIVE MG/DL
RBC # BLD: 5.43 M/CU MM (ref 4.6–6.2)
RSV RNA NPH QL NAA+NON-PROBE: NOT DETECTED
RV+EV RNA NPH QL NAA+NON-PROBE: NOT DETECTED
SARS-COV-2 RDRP RESP QL NAA+PROBE: NOT DETECTED
SARS-COV-2 RNA NPH QL NAA+NON-PROBE: NOT DETECTED
SEGMENTED NEUTROPHILS ABSOLUTE COUNT: 15.9 K/CU MM
SEGMENTED NEUTROPHILS RELATIVE PERCENT: 89 % (ref 36–66)
SODIUM BLD-SCNC: 139 MMOL/L (ref 135–145)
SOURCE, BLOOD GAS: ABNORMAL
SOURCE: NORMAL
SPECIFIC GRAVITY UA: 1.02 (ref 1–1.03)
TARGET CELLS: ABNORMAL
TRIGL SERPL-MCNC: 90 MG/DL
TSH SERPL DL<=0.005 MIU/L-ACNC: 0.63 UIU/ML (ref 0.27–4.2)
UROBILINOGEN, URINE: 0.2 MG/DL (ref 0.2–1)
WBC # BLD: 17.8 K/CU MM (ref 4–10.5)

## 2023-11-18 PROCEDURE — 2580000003 HC RX 258: Performed by: FAMILY MEDICINE

## 2023-11-18 PROCEDURE — 85027 COMPLETE CBC AUTOMATED: CPT

## 2023-11-18 PROCEDURE — 85007 BL SMEAR W/DIFF WBC COUNT: CPT

## 2023-11-18 PROCEDURE — 94761 N-INVAS EAR/PLS OXIMETRY MLT: CPT

## 2023-11-18 PROCEDURE — 87086 URINE CULTURE/COLONY COUNT: CPT

## 2023-11-18 PROCEDURE — 96365 THER/PROPH/DIAG IV INF INIT: CPT

## 2023-11-18 PROCEDURE — 2580000003 HC RX 258: Performed by: EMERGENCY MEDICINE

## 2023-11-18 PROCEDURE — 93005 ELECTROCARDIOGRAM TRACING: CPT | Performed by: EMERGENCY MEDICINE

## 2023-11-18 PROCEDURE — 94640 AIRWAY INHALATION TREATMENT: CPT

## 2023-11-18 PROCEDURE — 84443 ASSAY THYROID STIM HORMONE: CPT

## 2023-11-18 PROCEDURE — 80048 BASIC METABOLIC PNL TOTAL CA: CPT

## 2023-11-18 PROCEDURE — 80061 LIPID PANEL: CPT

## 2023-11-18 PROCEDURE — 36415 COLL VENOUS BLD VENIPUNCTURE: CPT

## 2023-11-18 PROCEDURE — 6360000002 HC RX W HCPCS: Performed by: EMERGENCY MEDICINE

## 2023-11-18 PROCEDURE — 6360000002 HC RX W HCPCS: Performed by: FAMILY MEDICINE

## 2023-11-18 PROCEDURE — 2700000000 HC OXYGEN THERAPY PER DAY

## 2023-11-18 PROCEDURE — 81003 URINALYSIS AUTO W/O SCOPE: CPT

## 2023-11-18 PROCEDURE — 1200000000 HC SEMI PRIVATE

## 2023-11-18 PROCEDURE — 87502 INFLUENZA DNA AMP PROBE: CPT

## 2023-11-18 PROCEDURE — 6370000000 HC RX 637 (ALT 250 FOR IP): Performed by: NURSE PRACTITIONER

## 2023-11-18 PROCEDURE — 83880 ASSAY OF NATRIURETIC PEPTIDE: CPT

## 2023-11-18 PROCEDURE — 87040 BLOOD CULTURE FOR BACTERIA: CPT

## 2023-11-18 PROCEDURE — 85379 FIBRIN DEGRADATION QUANT: CPT

## 2023-11-18 PROCEDURE — 94664 DEMO&/EVAL PT USE INHALER: CPT

## 2023-11-18 PROCEDURE — 84145 PROCALCITONIN (PCT): CPT

## 2023-11-18 PROCEDURE — 87635 SARS-COV-2 COVID-19 AMP PRB: CPT

## 2023-11-18 PROCEDURE — 0202U NFCT DS 22 TRGT SARS-COV-2: CPT

## 2023-11-18 PROCEDURE — 96375 TX/PRO/DX INJ NEW DRUG ADDON: CPT

## 2023-11-18 PROCEDURE — 6370000000 HC RX 637 (ALT 250 FOR IP): Performed by: FAMILY MEDICINE

## 2023-11-18 PROCEDURE — 82800 BLOOD PH: CPT

## 2023-11-18 PROCEDURE — 6370000000 HC RX 637 (ALT 250 FOR IP): Performed by: EMERGENCY MEDICINE

## 2023-11-18 PROCEDURE — 99285 EMERGENCY DEPT VISIT HI MDM: CPT

## 2023-11-18 PROCEDURE — 71045 X-RAY EXAM CHEST 1 VIEW: CPT

## 2023-11-18 RX ORDER — ACETAMINOPHEN 325 MG/1
650 TABLET ORAL EVERY 6 HOURS PRN
Status: DISCONTINUED | OUTPATIENT
Start: 2023-11-18 | End: 2023-11-19 | Stop reason: HOSPADM

## 2023-11-18 RX ORDER — GUAIFENESIN/DEXTROMETHORPHAN 100-10MG/5
5 SYRUP ORAL EVERY 4 HOURS PRN
Status: DISCONTINUED | OUTPATIENT
Start: 2023-11-18 | End: 2023-11-19 | Stop reason: HOSPADM

## 2023-11-18 RX ORDER — ALBUTEROL SULFATE 90 UG/1
1 AEROSOL, METERED RESPIRATORY (INHALATION) EVERY 6 HOURS PRN
Status: DISCONTINUED | OUTPATIENT
Start: 2023-11-18 | End: 2023-11-19 | Stop reason: HOSPADM

## 2023-11-18 RX ORDER — SODIUM CHLORIDE 0.9 % (FLUSH) 0.9 %
5-40 SYRINGE (ML) INJECTION EVERY 12 HOURS SCHEDULED
Status: DISCONTINUED | OUTPATIENT
Start: 2023-11-18 | End: 2023-11-19 | Stop reason: HOSPADM

## 2023-11-18 RX ORDER — IPRATROPIUM BROMIDE AND ALBUTEROL SULFATE 2.5; .5 MG/3ML; MG/3ML
1 SOLUTION RESPIRATORY (INHALATION)
Status: DISCONTINUED | OUTPATIENT
Start: 2023-11-18 | End: 2023-11-19 | Stop reason: HOSPADM

## 2023-11-18 RX ORDER — GUAIFENESIN 400 MG/1
400 TABLET ORAL 2 TIMES DAILY
Status: DISCONTINUED | OUTPATIENT
Start: 2023-11-18 | End: 2023-11-18

## 2023-11-18 RX ORDER — POLYETHYLENE GLYCOL 3350 17 G/17G
17 POWDER, FOR SOLUTION ORAL DAILY PRN
Status: DISCONTINUED | OUTPATIENT
Start: 2023-11-18 | End: 2023-11-19 | Stop reason: HOSPADM

## 2023-11-18 RX ORDER — ONDANSETRON 2 MG/ML
4 INJECTION INTRAMUSCULAR; INTRAVENOUS EVERY 6 HOURS PRN
Status: DISCONTINUED | OUTPATIENT
Start: 2023-11-18 | End: 2023-11-19 | Stop reason: HOSPADM

## 2023-11-18 RX ORDER — GUAIFENESIN 600 MG/1
600 TABLET, EXTENDED RELEASE ORAL 2 TIMES DAILY
Status: DISCONTINUED | OUTPATIENT
Start: 2023-11-18 | End: 2023-11-19 | Stop reason: HOSPADM

## 2023-11-18 RX ORDER — DEXAMETHASONE SODIUM PHOSPHATE 10 MG/ML
10 INJECTION, SOLUTION INTRAMUSCULAR; INTRAVENOUS
Status: COMPLETED | OUTPATIENT
Start: 2023-11-18 | End: 2023-11-18

## 2023-11-18 RX ORDER — ONDANSETRON 4 MG/1
4 TABLET, ORALLY DISINTEGRATING ORAL EVERY 8 HOURS PRN
Status: DISCONTINUED | OUTPATIENT
Start: 2023-11-18 | End: 2023-11-19 | Stop reason: HOSPADM

## 2023-11-18 RX ORDER — IPRATROPIUM BROMIDE AND ALBUTEROL SULFATE 2.5; .5 MG/3ML; MG/3ML
3 SOLUTION RESPIRATORY (INHALATION)
Status: COMPLETED | OUTPATIENT
Start: 2023-11-18 | End: 2023-11-18

## 2023-11-18 RX ORDER — SODIUM CHLORIDE 9 MG/ML
INJECTION, SOLUTION INTRAVENOUS PRN
Status: DISCONTINUED | OUTPATIENT
Start: 2023-11-18 | End: 2023-11-19 | Stop reason: HOSPADM

## 2023-11-18 RX ORDER — PREDNISONE 20 MG/1
40 TABLET ORAL DAILY
Status: DISCONTINUED | OUTPATIENT
Start: 2023-11-20 | End: 2023-11-19 | Stop reason: HOSPADM

## 2023-11-18 RX ORDER — ACETAMINOPHEN 650 MG/1
650 SUPPOSITORY RECTAL EVERY 6 HOURS PRN
Status: DISCONTINUED | OUTPATIENT
Start: 2023-11-18 | End: 2023-11-19 | Stop reason: HOSPADM

## 2023-11-18 RX ORDER — ENOXAPARIN SODIUM 100 MG/ML
40 INJECTION SUBCUTANEOUS DAILY
Status: DISCONTINUED | OUTPATIENT
Start: 2023-11-18 | End: 2023-11-19 | Stop reason: HOSPADM

## 2023-11-18 RX ORDER — SODIUM CHLORIDE 0.9 % (FLUSH) 0.9 %
5-40 SYRINGE (ML) INJECTION PRN
Status: DISCONTINUED | OUTPATIENT
Start: 2023-11-18 | End: 2023-11-19 | Stop reason: HOSPADM

## 2023-11-18 RX ADMIN — SODIUM CHLORIDE 25 ML: 9 INJECTION, SOLUTION INTRAVENOUS at 10:50

## 2023-11-18 RX ADMIN — SODIUM CHLORIDE, PRESERVATIVE FREE 10 ML: 5 INJECTION INTRAVENOUS at 21:25

## 2023-11-18 RX ADMIN — SODIUM CHLORIDE, PRESERVATIVE FREE 10 ML: 5 INJECTION INTRAVENOUS at 10:38

## 2023-11-18 RX ADMIN — GUAIFENESIN 600 MG: 600 TABLET ORAL at 10:53

## 2023-11-18 RX ADMIN — MOMETASONE FUROATE AND FORMOTEROL FUMARATE DIHYDRATE 2 PUFF: 200; 5 AEROSOL RESPIRATORY (INHALATION) at 11:30

## 2023-11-18 RX ADMIN — IPRATROPIUM BROMIDE AND ALBUTEROL SULFATE 3 DOSE: .5; 3 SOLUTION RESPIRATORY (INHALATION) at 01:55

## 2023-11-18 RX ADMIN — CEFTRIAXONE 2000 MG: 2 INJECTION, POWDER, FOR SOLUTION INTRAMUSCULAR; INTRAVENOUS at 10:51

## 2023-11-18 RX ADMIN — ENOXAPARIN SODIUM 40 MG: 100 INJECTION SUBCUTANEOUS at 10:36

## 2023-11-18 RX ADMIN — IPRATROPIUM BROMIDE AND ALBUTEROL SULFATE 1 DOSE: .5; 3 SOLUTION RESPIRATORY (INHALATION) at 16:20

## 2023-11-18 RX ADMIN — AZITHROMYCIN MONOHYDRATE 500 MG: 500 INJECTION, POWDER, LYOPHILIZED, FOR SOLUTION INTRAVENOUS at 05:19

## 2023-11-18 RX ADMIN — IPRATROPIUM BROMIDE AND ALBUTEROL SULFATE 1 DOSE: .5; 3 SOLUTION RESPIRATORY (INHALATION) at 11:30

## 2023-11-18 RX ADMIN — GUAIFENESIN 600 MG: 600 TABLET ORAL at 21:28

## 2023-11-18 RX ADMIN — METHYLPREDNISOLONE SODIUM SUCCINATE 40 MG: 40 INJECTION, POWDER, FOR SOLUTION INTRAMUSCULAR; INTRAVENOUS at 21:25

## 2023-11-18 RX ADMIN — MOMETASONE FUROATE AND FORMOTEROL FUMARATE DIHYDRATE 2 PUFF: 200; 5 AEROSOL RESPIRATORY (INHALATION) at 21:20

## 2023-11-18 RX ADMIN — METHYLPREDNISOLONE SODIUM SUCCINATE 40 MG: 40 INJECTION, POWDER, FOR SOLUTION INTRAMUSCULAR; INTRAVENOUS at 14:53

## 2023-11-18 RX ADMIN — METHYLPREDNISOLONE SODIUM SUCCINATE 40 MG: 40 INJECTION, POWDER, FOR SOLUTION INTRAMUSCULAR; INTRAVENOUS at 10:36

## 2023-11-18 RX ADMIN — IPRATROPIUM BROMIDE AND ALBUTEROL SULFATE 1 DOSE: .5; 3 SOLUTION RESPIRATORY (INHALATION) at 21:20

## 2023-11-18 RX ADMIN — DEXAMETHASONE SODIUM PHOSPHATE 10 MG: 10 INJECTION, SOLUTION INTRAMUSCULAR; INTRAVENOUS at 02:58

## 2023-11-18 ASSESSMENT — PAIN SCALES - GENERAL
PAINLEVEL_OUTOF10: 0

## 2023-11-18 ASSESSMENT — PAIN - FUNCTIONAL ASSESSMENT: PAIN_FUNCTIONAL_ASSESSMENT: NONE - DENIES PAIN

## 2023-11-18 NOTE — PLAN OF CARE
Problem: Cardiovascular - Adult  Goal: Maintains optimal cardiac output and hemodynamic stability  Outcome: Progressing     Problem: Infection - Adult  Goal: Absence of infection at discharge  Outcome: Progressing     Problem: Metabolic/Fluid and Electrolytes - Adult  Goal: Electrolytes maintained within normal limits  Outcome: Progressing  Goal: Hemodynamic stability and optimal renal function maintained  Outcome: Progressing

## 2023-11-18 NOTE — H&P
V2.0  History and Physical      Name:  Christy Castro /Age/Sex: 1945  (67 y.o. male)   MRN & CSN:  [id number] & [id number] Encounter Date/Time: 2023 6:21 AM EST   Location:   PCP: Martha Costello MD       Hospital Day: 1    Assessment and Plan:   Christy Castro is a 66 y.o. male with a past medical history of COPD, HTN who presents with Chronic obstructive pulmonary disease with acute exacerbation ([address])    SIRS in setting of COPD Exacerbation   - presented with persistent cough, worsening shortness of breath requiring O2   2 LPM NC to maintain SpO2 >92%; pt does not wear home O2  - monitor SpO2; wean as tolerated  - CXR : No acute cardiopulmonary disease.   - CTA 22: No evidence of pulmonary embolism or aortic dissection. Mild bronchial wall thickening, which may be seen in inflammatory   Conditions such as bronchitis, reactive airways disease, and smoking.  - Noted hypoxia with exertion; pt sitting in bedside chair unable to tolerate ED cot  - VBG pending; R 25, P>119, SpO2 <92% on RA with quick desaturation  - afebrile with leukocytosis; WBC 17.8  - NEG COVID/FLU; will check viral respiratory panel  - check procalcitonin, lactic, blood cultures/urine cultures, UA r/o infectious source  - continue home inhalers;  Followed by Dr. Len Summers (last appt 23); admits to using   home inhalers QID; he is a former smoker (quit )  - start scheduled/PRN nebs, steroids, azithromycin, mucinex, pulmonary hygiene   - monitor respiratory status; trend CBC, CMP QD  - TELE    Tachycardia  - HR >119  - EKG: Sinus tachycardia Left axis deviation   Inferior infarct (cited on or before 22-MAY-2022)   Abnormal ECG When compared with ECG of 22-MAY-2022 05:21,   premature atrial complexes are no longer present;  QRS axis shifted left   - Pt denies previous cardiac workup; denies CP, HA, vision changes, or dizziness  - Will monitor on TELE  - Will check TSH  - Consider cardiology consult    Hypertension - /106 (110) on admit   - Pt has noted PMH in EMR; denies current medication  - monitor BP   - continue ASA 81 MG  - will check Lipid panel; A1c 5/22/22 5.7    Fatigue  - Pt admits increased fatigue in setting above  - Fall precautions  - will monitor daily CMP/CBC    Obesity  - BMI 31.61  - Diet/activity as tolerated    This patient was seen and examined autonomously  A hospitalist attending physician was available for questions/consultation as needed. I have spoken with ED physician and agree with admission for COPD exacerbation. This patient was seen and examined in conjunction with Dr. Piper Doctor via [facility]. He was agreeable with the plan and management as dictated above. Hospital Problems             Last Modified POA    * (Principal) Chronic obstructive pulmonary disease with acute exacerbation ([address]) 11/18/2023 Yes       Disposition:   Current Living situation: Home  Expected Disposition: Same  Estimated D/C: 2 -3 days    Diet ADULT DIET; Regular; Low Fat/Low Chol/High Fiber/MELODY   DVT Prophylaxis [x] Lovenox, []  Heparin, [] SCDs, [] Ambulation,  [] Eliquis, [] Xarelto   Code Status Full Code   Surrogate Decision Maker/ POA China Camejo (Spouse)  [phone]     History from:     patient, electronic medical record    History of Present Illness:     Chief Complaint: Chronic obstructive pulmonary disease with acute exacerbation ([address])    Yumiko Schneider is a 66 y.o. male who presents with worsening shortness of breath over the past few weeks. He admits increased fatigue and intermittent cough that is nonproductive. Pt denies CP, HA, dizziness, fever, chills, N/V/D, change in appetite, ABD pain, vision changes, tremors, palpitations or change in LOC. Pt admits that he is a former smoker (quit 2016) and is followed by Dr. Corky Gale for pulmonology. He denies cardiac history. He denies previous or known ill contacts.      Review of Systems:   Ten point ROS reviewed negative, unless as noted and phrases that may be inappropriate. If there are any questions or concerns please feel free to contact the dictating provider for clarification.      Electronically signed by ANA Kirby CNP on 11/18/2023 at 6:21 AM

## 2023-11-18 NOTE — PROGRESS NOTES
V2.0    [facility] Progress Note      Name:  Felicitas Black /Age/Sex: 1945  (66 y.o. male)   MRN & CSN:  [id number] & [id number] Encounter Date/Time: 2023 12:12 PM EST   Location:   PCP: Mamadou Ferguson MD     Attending:Cristela Jarrett, [address] [place] [address] Day: 1    Assessment and Recommendations   Felicitas Black is a 66 y.o. male who presents with Chronic obstructive pulmonary disease with acute exacerbation ([address] [facility])      Plan:     SIRS   Acute respiratory failure with hypoxia  Acute COPD Exacerbation   Parainfluenza viral infection  -Presenting criteria: Tachycardia, tachypnea, leukocytosis (17.8)  -persistent cough, worsening shortness of breath requiring O2   -Requiring supplemental oxygen to maintain respiratory status   -No baseline oxygen requirement  - CXR : No acute cardiopulmonary disease.   -Negative COVID/FLU;   -Procalcitonin in 0.072,   -Pending lactic acid   -Pending BLC/urine Cx blood cultures  - continue home inhalers;  -Followed by Dr. Manda Matamoros (last appt 23)   -former smoker (quit )  -Continue bronchodilators/ICS  -Solu-Medrol transition to oral prednisone  -Azithromycin/Rocephin  -PRN symptom control  -Wean oxygen as tolerated  -Encourage ambulation  -Dispo: Hopefully home in next 24-48 hours     Persistent tachycardia  - HR >119 improved trend since admission  - EKG: Sinus tachycardia Left axis deviation   Inferior infarct (cited on or before 22-MAY-2022)   Abnormal ECG When compared with ECG of 22-MAY-2022 05:21,   premature atrial complexes are no longer present;  QRS axis shifted left   -No previous cardiac work-up documented dizziness  -Telemetry  -TSH 0.627  - Consider cardiology consult if no improvement in the am      Hypertension   - /106 (110) on admit   - Pt has noted PMH in EMR; denies current medication  -Trends appear controlled     Obesity  - BMI 31.61  - Diet/activity as tolerated         Diet ADULT DIET;  Regular; Low Fat/Low Chol/High Fiber/MELODY   DVT Component Value Date/Time    LABA1C 5.7 05/22/2022 07:00 PM     TSH: No results found for: \"TSH\"  Troponin:   Lab Results   Component Value Date/Time    TROPONINT <0.010 05/21/2022 07:10 PM    TROPONINT <0.010 12/27/2019 04:58 PM    TROPONINT <0.010 10/13/2019 08:20 AM     Lactic Acid: No results for input(s): \"LACTA\" in the last 72 hours.   BNP:   Recent Labs     11/18/23  0448   PROBNP <36.00     UA:  Lab Results   Component Value Date/Time    NITRU NEGATIVE 05/21/2022 08:30 PM    COLORU YELLOW 05/21/2022 08:30 PM    WBCUA 0 05/21/2022 08:30 PM    RBCUA 0 05/21/2022 08:30 PM    BACTERIA 0 05/21/2022 08:30 PM    CLARITYU CLEAR 05/21/2022 08:30 PM    SPECGRAV 1.010 05/21/2022 08:30 PM    LEUKOCYTESUR NEGATIVE 05/21/2022 08:30 PM    UROBILINOGEN 1.0 05/21/2022 08:30 PM    BILIRUBINUR NEGATIVE 05/21/2022 08:30 PM    BLOODU NEGATIVE 05/21/2022 08:30 PM    KETUA NEGATIVE 05/21/2022 08:30 PM     Urine Cultures: No results found for: \"LABURIN\"  Blood Cultures: No results found for: \"BC\"  No results found for: \"BLOODCULT2\"  Organism: No results found for: \"ORG\"      Electronically signed by ANA Proctor CNP on 11/18/2023 at 12:12 PM

## 2023-11-19 VITALS
WEIGHT: 192.2 LBS | OXYGEN SATURATION: 93 % | HEIGHT: 64 IN | TEMPERATURE: 97.5 F | BODY MASS INDEX: 32.81 KG/M2 | SYSTOLIC BLOOD PRESSURE: 115 MMHG | RESPIRATION RATE: 16 BRPM | DIASTOLIC BLOOD PRESSURE: 74 MMHG | HEART RATE: 90 BPM

## 2023-11-19 LAB
ALBUMIN SERPL-MCNC: 3.6 GM/DL (ref 3.4–5)
ALP BLD-CCNC: 108 IU/L (ref 40–129)
ALT SERPL-CCNC: 38 U/L (ref 10–40)
ANION GAP SERPL CALCULATED.3IONS-SCNC: 14 MMOL/L (ref 4–16)
AST SERPL-CCNC: 16 IU/L (ref 15–37)
BASOPHILS ABSOLUTE: 0 K/CU MM
BASOPHILS RELATIVE PERCENT: 0.2 % (ref 0–1)
BILIRUB SERPL-MCNC: 0.6 MG/DL (ref 0–1)
BUN SERPL-MCNC: 20 MG/DL (ref 6–23)
CALCIUM SERPL-MCNC: 8.8 MG/DL (ref 8.3–10.6)
CHLORIDE BLD-SCNC: 109 MMOL/L (ref 99–110)
CO2: 20 MMOL/L (ref 21–32)
CREAT SERPL-MCNC: 0.7 MG/DL (ref 0.9–1.3)
CULTURE: NORMAL
CULTURE: NORMAL
DIFFERENTIAL TYPE: ABNORMAL
EOSINOPHILS ABSOLUTE: 0 K/CU MM
EOSINOPHILS RELATIVE PERCENT: 0 % (ref 0–3)
GFR SERPL CREATININE-BSD FRML MDRD: >60 ML/MIN/1.73M2
GLUCOSE SERPL-MCNC: 179 MG/DL (ref 70–99)
HCT VFR BLD CALC: 45.4 % (ref 42–52)
HEMOGLOBIN: 14.8 GM/DL (ref 13.5–18)
IMMATURE NEUTROPHIL %: 0.7 % (ref 0–0.43)
LYMPHOCYTES ABSOLUTE: 0.7 K/CU MM
LYMPHOCYTES RELATIVE PERCENT: 3.2 % (ref 24–44)
Lab: NORMAL
Lab: NORMAL
MCH RBC QN AUTO: 28.6 PG (ref 27–31)
MCHC RBC AUTO-ENTMCNC: 32.6 % (ref 32–36)
MCV RBC AUTO: 87.6 FL (ref 78–100)
MONOCYTES ABSOLUTE: 0.4 K/CU MM
MONOCYTES RELATIVE PERCENT: 1.9 % (ref 0–4)
PDW BLD-RTO: 13.9 % (ref 11.7–14.9)
PLATELET # BLD: 244 K/CU MM (ref 140–440)
PMV BLD AUTO: 9.2 FL (ref 7.5–11.1)
POTASSIUM SERPL-SCNC: 4.1 MMOL/L (ref 3.5–5.1)
RBC # BLD: 5.18 M/CU MM (ref 4.6–6.2)
SEGMENTED NEUTROPHILS ABSOLUTE COUNT: 21.5 K/CU MM
SEGMENTED NEUTROPHILS RELATIVE PERCENT: 94 % (ref 36–66)
SODIUM BLD-SCNC: 143 MMOL/L (ref 135–145)
SPECIMEN: NORMAL
SPECIMEN: NORMAL
TOTAL IMMATURE NEUTOROPHIL: 0.17 K/CU MM
TOTAL PROTEIN: 6 GM/DL (ref 6.4–8.2)
WBC # BLD: 22.8 K/CU MM (ref 4–10.5)

## 2023-11-19 PROCEDURE — 6370000000 HC RX 637 (ALT 250 FOR IP): Performed by: NURSE PRACTITIONER

## 2023-11-19 PROCEDURE — 6370000000 HC RX 637 (ALT 250 FOR IP): Performed by: FAMILY MEDICINE

## 2023-11-19 PROCEDURE — 2580000003 HC RX 258: Performed by: FAMILY MEDICINE

## 2023-11-19 PROCEDURE — 6360000002 HC RX W HCPCS: Performed by: FAMILY MEDICINE

## 2023-11-19 PROCEDURE — 80053 COMPREHEN METABOLIC PANEL: CPT

## 2023-11-19 PROCEDURE — 94640 AIRWAY INHALATION TREATMENT: CPT

## 2023-11-19 PROCEDURE — 85025 COMPLETE CBC W/AUTO DIFF WBC: CPT

## 2023-11-19 RX ORDER — PREDNISONE 10 MG/1
10 TABLET ORAL DAILY
Qty: 10 TABLET | Refills: 0 | Status: SHIPPED | OUTPATIENT
Start: 2023-11-30 | End: 2023-11-29

## 2023-11-19 RX ORDER — AMOXICILLIN AND CLAVULANATE POTASSIUM 875; 125 MG/1; MG/1
1 TABLET, FILM COATED ORAL 2 TIMES DAILY
Qty: 10 TABLET | Refills: 0 | Status: SHIPPED | OUTPATIENT
Start: 2023-11-19 | End: 2023-11-24

## 2023-11-19 RX ORDER — PREDNISONE 20 MG/1
20 TABLET ORAL DAILY
Qty: 10 TABLET | Refills: 0 | Status: SHIPPED | OUTPATIENT
Start: 2023-11-19 | End: 2023-11-29 | Stop reason: ALTCHOICE

## 2023-11-19 RX ORDER — BUDESONIDE AND FORMOTEROL FUMARATE DIHYDRATE 160; 4.5 UG/1; UG/1
2 AEROSOL RESPIRATORY (INHALATION) 2 TIMES DAILY
Qty: 10.2 G | Refills: 0 | Status: SHIPPED | OUTPATIENT
Start: 2023-11-19

## 2023-11-19 RX ORDER — PREDNISONE 5 MG/1
5 TABLET ORAL DAILY
Qty: 10 TABLET | Refills: 0 | Status: SHIPPED | OUTPATIENT
Start: 2023-12-11 | End: 2023-12-21

## 2023-11-19 RX ADMIN — CEFTRIAXONE 2000 MG: 2 INJECTION, POWDER, FOR SOLUTION INTRAMUSCULAR; INTRAVENOUS at 10:18

## 2023-11-19 RX ADMIN — AZITHROMYCIN MONOHYDRATE 500 MG: 500 INJECTION, POWDER, LYOPHILIZED, FOR SOLUTION INTRAVENOUS at 05:16

## 2023-11-19 RX ADMIN — SODIUM CHLORIDE, PRESERVATIVE FREE 10 ML: 5 INJECTION INTRAVENOUS at 02:37

## 2023-11-19 RX ADMIN — SODIUM CHLORIDE 100 ML/HR: 9 INJECTION, SOLUTION INTRAVENOUS at 05:14

## 2023-11-19 RX ADMIN — SODIUM CHLORIDE, PRESERVATIVE FREE 10 ML: 5 INJECTION INTRAVENOUS at 10:06

## 2023-11-19 RX ADMIN — SODIUM CHLORIDE 25 ML: 9 INJECTION, SOLUTION INTRAVENOUS at 10:15

## 2023-11-19 RX ADMIN — GUAIFENESIN 600 MG: 600 TABLET ORAL at 10:02

## 2023-11-19 RX ADMIN — SODIUM CHLORIDE, PRESERVATIVE FREE 10 ML: 5 INJECTION INTRAVENOUS at 07:12

## 2023-11-19 RX ADMIN — METHYLPREDNISOLONE SODIUM SUCCINATE 40 MG: 40 INJECTION, POWDER, FOR SOLUTION INTRAMUSCULAR; INTRAVENOUS at 07:12

## 2023-11-19 RX ADMIN — IPRATROPIUM BROMIDE AND ALBUTEROL SULFATE 1 DOSE: .5; 3 SOLUTION RESPIRATORY (INHALATION) at 08:02

## 2023-11-19 RX ADMIN — METHYLPREDNISOLONE SODIUM SUCCINATE 40 MG: 40 INJECTION, POWDER, FOR SOLUTION INTRAMUSCULAR; INTRAVENOUS at 02:37

## 2023-11-19 RX ADMIN — MOMETASONE FUROATE AND FORMOTEROL FUMARATE DIHYDRATE 2 PUFF: 200; 5 AEROSOL RESPIRATORY (INHALATION) at 08:02

## 2023-11-19 ASSESSMENT — PAIN SCALES - GENERAL
PAINLEVEL_OUTOF10: 0

## 2023-11-19 NOTE — DISCHARGE SUMMARY
Hospital Medicine Discharge Summary    Patient: Greyson Nuñez   : [id number]     Admit Date: 2023   Discharge Date: 2023    Disposition:  [x]Home   []HHC  []SNF  []ECF  []Acute Rehab  []LTAC  []Hospice  Code status:  [x]Full  []DNR/CCA  []Limited (DNR/CCA with Do Not Intubate)  []DNRCC  Condition at Discharge: Stable  Primary Care Provider: Natalie Diamond MD    Admitting Provider: Michael Sullivan MD  Discharge Provider: Sharla Wells, APRN - CNP     Discharge Diagnoses: Active Hospital Problems    Diagnosis     Chronic obstructive pulmonary disease with acute exacerbation (HCC) [J44.1]        Presenting Admission History:      presents with worsening shortness of breath over the past few weeks. He admits increased fatigue and intermittent cough that is nonproductive. Pt denies CP, HA, dizziness, fever, chills, N/V/D, change in appetite, ABD pain, vision changes, tremors, palpitations or change in LOC. Pt admits that he is a former smoker (quit ) and is followed by Dr. Avtar Montero for pulmonology. He denies cardiac history. He denies previous or known ill contacts.      Assessment/Plan:      SIRS   Acute respiratory failure with hypoxia  Acute COPD Exacerbation   Parainfluenza viral infection  -Presenting criteria: Tachycardia, tachypnea, leukocytosis (17.8)  -persistent cough, worsening shortness of breath requiring O2   -Stable on room air  -CXR : No acute cardiopulmonary disease.   -Negative COVID/FLU;   -RVP positive for parainfluenza 4  -Procalcitonin in 0.072,   -Pending lactic acid   -Pending BLC/urine Cx blood cultures  - continue home inhalers;  -former smoker (quit )  -Continue bronchodilators/ICS  -Slow prednisone taper  -Augmentin x5 days to complete antibiotic course given severity of symptoms  -Symbicort prescribed per patient request  -Dispo: Home in stable condition  -Follow-up with pulmonologist     Persistent tachycardia  -Resolved  - EKG: Sinus tachycardia Left axis deviation

## 2023-11-19 NOTE — PROGRESS NOTES
Discharge instructions given to patient and wife, all questions answered, verbalized understanding, ambulated to car at his request with all belongings accompanied by his wife.

## 2023-11-19 NOTE — PLAN OF CARE
Problem: Cardiovascular - Adult  Goal: Maintains optimal cardiac output and hemodynamic stability  11/19/2023 1033 by Eric Emmanuel RN  Outcome: Completed  11/19/2023 0019 by Rocio HARPER  Outcome: Progressing     Problem: Infection - Adult  Goal: Absence of infection at discharge  11/19/2023 1033 by Eric Emmanuel RN  Outcome: Completed  11/19/2023 0019 by Rocio HARPER  Outcome: Progressing     Problem: Metabolic/Fluid and Electrolytes - Adult  Goal: Electrolytes maintained within normal limits  11/19/2023 1033 by Eric Emmanuel RN  Outcome: Completed  11/19/2023 0019 by Anne Alcocer  Outcome: Progressing  Goal: Hemodynamic stability and optimal renal function maintained  11/19/2023 1033 by Eric Emmanuel RN  Outcome: Completed  11/19/2023 0019 by Rocio HARPER  Outcome: Progressing     Problem: Metabolic/Fluid and Electrolytes - Adult  Goal: Hemodynamic stability and optimal renal function maintained  11/19/2023 1033 by Eric Emmanuel RN  Outcome: Completed  11/19/2023 0019 by Rocio HARPER  Outcome: Progressing     Problem: Pain  Goal: Verbalizes/displays adequate comfort level or baseline comfort level  Outcome: Completed

## 2023-11-19 NOTE — PLAN OF CARE
Problem: Cardiovascular - Adult  Goal: Maintains optimal cardiac output and hemodynamic stability  11/19/2023 0019 by eGnia Gilman D  Outcome: Progressing  11/18/2023 1108 by Yessenia Null RN  Outcome: Progressing     Problem: Infection - Adult  Goal: Absence of infection at discharge  11/19/2023 0019 by Genia Caroal D  Outcome: Progressing  11/18/2023 1108 by Yessenia Null RN  Outcome: Progressing     Problem: Metabolic/Fluid and Electrolytes - Adult  Goal: Electrolytes maintained within normal limits  11/19/2023 0019 by Genia Gilman D  Outcome: Progressing  11/18/2023 1108 by Yessenia Null RN  Outcome: Progressing  Goal: Hemodynamic stability and optimal renal function maintained  11/19/2023 0019 by Genia HARPER  Outcome: Progressing  11/18/2023 1108 by Yessenia Null RN  Outcome: Progressing

## 2023-11-20 LAB
CULTURE: NORMAL
EKG ATRIAL RATE: 113 BPM
EKG DIAGNOSIS: NORMAL
EKG P AXIS: 55 DEGREES
EKG P-R INTERVAL: 172 MS
EKG Q-T INTERVAL: 352 MS
EKG QRS DURATION: 90 MS
EKG QTC CALCULATION (BAZETT): 482 MS
EKG R AXIS: -35 DEGREES
EKG T AXIS: 74 DEGREES
EKG VENTRICULAR RATE: 113 BPM
Lab: NORMAL
SPECIMEN: NORMAL

## 2023-11-20 PROCEDURE — 93010 ELECTROCARDIOGRAM REPORT: CPT | Performed by: INTERNAL MEDICINE

## 2023-11-22 NOTE — ED PROVIDER NOTES
CC: dyspnea   Seen in room 2    HPI: This is a 66year old male with COPD who comes for evaluation of dyspnea for several days duration.      Nursing Notes Reviewed    Past Medical History:   Diagnosis Date    Chronic back pain     COPD (chronic obstructive pulmonary disease) (HCC)     Emphysema of lung ([address])     Pneumonia      Past Surgical History:   Procedure Laterality Date    CHOLECYSTECTOMY       Social History     Socioeconomic History    Marital status:      Spouse name: Not on file    Number of children: Not on file    Years of education: Not on file    Highest education level: Not on file   Occupational History    Not on file   Tobacco Use    Smoking status: Former     Packs/day: 0     Types: Cigarettes     Quit date: 2017     Years since quittin.4    Smokeless tobacco: Never   Vaping Use    Vaping Use: Never used   Substance and Sexual Activity    Alcohol use: No    Drug use: No    Sexual activity: Yes     Partners: Female   Other Topics Concern    Not on file   Social History Narrative    Not on file     Social Determinants of Health     Financial Resource Strain: Not on file   Food Insecurity: No Food Insecurity (2023)    Hunger Vital Sign     Worried About Running Out of Food in the Last Year: Never true     Ran Out of Food in the Last Year: Never true   Transportation Needs: No Transportation Needs (2023)    Transportation Problems ([facility])     In the past 12 months, has lack of reliable transportation kept you from medical appointments, meetings, work or from getting things needed for daily living?: Not on file   Physical Activity: Not on file   Stress: Not on file   Social Connections: Not on file   Intimate Partner Violence: Not on file   Housing Stability: [address]  (2023)    Housing Stability Vital Sign     Unable to Pay for Housing in the Last Year: No     Number of [address] in the Last Year: 1     Unstable Housing in the Last Year: No     No Known IntraVENous Stopped 11/18/23 0625)       Impression:  #1.   Acute moderate dyspnea       Abena Vargas MD  11/22/23 5064

## 2023-11-23 LAB
CULTURE: NORMAL
CULTURE: NORMAL
Lab: NORMAL
Lab: NORMAL
SPECIMEN: NORMAL
SPECIMEN: NORMAL

## 2024-07-16 ENCOUNTER — HOSPITAL ENCOUNTER (OUTPATIENT)
Age: 79
Discharge: HOME | End: 2024-07-16
Payer: MEDICARE

## 2024-07-16 ENCOUNTER — HOSPITAL ENCOUNTER (OUTPATIENT)
Dept: GENERAL RADIOLOGY | Age: 79
Discharge: HOME | End: 2024-07-16
Payer: MEDICARE

## 2024-07-16 DIAGNOSIS — M25.552 LEFT HIP PAIN: ICD-10-CM

## 2024-07-16 PROCEDURE — 73502 X-RAY EXAM HIP UNI 2-3 VIEWS: CPT

## 2024-12-24 ENCOUNTER — HOSPITAL ENCOUNTER (EMERGENCY)
Age: 79
Discharge: HOME | End: 2024-12-24
Attending: EMERGENCY MEDICINE
Payer: MEDICARE

## 2024-12-24 VITALS
HEIGHT: 64 IN | SYSTOLIC BLOOD PRESSURE: 136 MMHG | DIASTOLIC BLOOD PRESSURE: 89 MMHG | RESPIRATION RATE: 10 BRPM | OXYGEN SATURATION: 98 % | HEART RATE: 86 BPM | BODY MASS INDEX: 34.01 KG/M2 | TEMPERATURE: 98.3 F | WEIGHT: 199.2 LBS

## 2024-12-24 DIAGNOSIS — J44.1 COPD EXACERBATION (HCC): Primary | ICD-10-CM

## 2024-12-24 LAB
ALBUMIN SERPL-MCNC: 4.1 G/DL (ref 3.4–5)
ALBUMIN/GLOB SERPL: 2 {RATIO} (ref 1.1–2.2)
ALP SERPL-CCNC: 109 U/L (ref 40–129)
ALT SERPL-CCNC: 29 U/L (ref 10–40)
ANION GAP SERPL CALCULATED.3IONS-SCNC: 12 MMOL/L (ref 4–16)
AST SERPL-CCNC: 15 U/L (ref 15–37)
BASOPHILS # BLD: 0.07 K/UL
BASOPHILS NFR BLD: 1 % (ref 0–1)
BILIRUB SERPL-MCNC: 0.5 MG/DL (ref 0–1)
BNP SERPL-MCNC: 68 PG/ML (ref 0–450)
BUN SERPL-MCNC: 20 MG/DL (ref 6–23)
CALCIUM SERPL-MCNC: 8.8 MG/DL (ref 8.3–10.6)
CHLORIDE SERPL-SCNC: 106 MMOL/L (ref 99–110)
CO2 SERPL-SCNC: 23 MMOL/L (ref 21–32)
CREAT SERPL-MCNC: 0.7 MG/DL (ref 0.9–1.3)
EOSINOPHIL # BLD: 0.01 K/UL
EOSINOPHILS RELATIVE PERCENT: 0 % (ref 0–3)
ERYTHROCYTE [DISTWIDTH] IN BLOOD BY AUTOMATED COUNT: 14.3 % (ref 11.7–14.9)
GFR, ESTIMATED: >90 ML/MIN/1.73M2
GLUCOSE SERPL-MCNC: 187 MG/DL (ref 70–99)
HCT VFR BLD AUTO: 45.1 % (ref 42–52)
HGB BLD-MCNC: 14.5 G/DL (ref 13.5–18)
IMM GRANULOCYTES # BLD AUTO: 0.24 K/UL
IMM GRANULOCYTES NFR BLD: 2 %
LYMPHOCYTES NFR BLD: 0.71 K/UL
LYMPHOCYTES RELATIVE PERCENT: 6 % (ref 24–44)
MCH RBC QN AUTO: 27.3 PG (ref 27–31)
MCHC RBC AUTO-ENTMCNC: 32.2 G/DL (ref 32–36)
MCV RBC AUTO: 84.9 FL (ref 78–100)
MONOCYTES NFR BLD: 0.38 K/UL
MONOCYTES NFR BLD: 3 % (ref 0–4)
NEUTROPHILS NFR BLD: 89 % (ref 36–66)
NEUTS SEG NFR BLD: 10.94 K/UL
PLATELET # BLD AUTO: 285 K/UL (ref 140–440)
PMV BLD AUTO: 8.6 FL (ref 7.5–11.1)
POTASSIUM SERPL-SCNC: 4.6 MMOL/L (ref 3.5–5.1)
PROT SERPL-MCNC: 6.2 G/DL (ref 6.4–8.2)
RBC # BLD AUTO: 5.31 M/UL (ref 4.6–6.2)
SODIUM SERPL-SCNC: 141 MMOL/L (ref 135–145)
WBC OTHER # BLD: 12.4 K/UL (ref 4–10.5)

## 2024-12-24 PROCEDURE — 6370000000 HC RX 637 (ALT 250 FOR IP): Performed by: EMERGENCY MEDICINE

## 2024-12-24 PROCEDURE — 94640 AIRWAY INHALATION TREATMENT: CPT

## 2024-12-24 PROCEDURE — 6360000002 HC RX W HCPCS: Performed by: EMERGENCY MEDICINE

## 2024-12-24 PROCEDURE — 2500000003 HC RX 250 WO HCPCS: Performed by: EMERGENCY MEDICINE

## 2024-12-24 PROCEDURE — 83880 ASSAY OF NATRIURETIC PEPTIDE: CPT

## 2024-12-24 PROCEDURE — 85025 COMPLETE CBC W/AUTO DIFF WBC: CPT

## 2024-12-24 PROCEDURE — 96375 TX/PRO/DX INJ NEW DRUG ADDON: CPT

## 2024-12-24 PROCEDURE — 96365 THER/PROPH/DIAG IV INF INIT: CPT

## 2024-12-24 PROCEDURE — 80053 COMPREHEN METABOLIC PANEL: CPT

## 2024-12-24 PROCEDURE — 99284 EMERGENCY DEPT VISIT MOD MDM: CPT

## 2024-12-24 RX ORDER — PREDNISONE 10 MG/1
TABLET ORAL
Qty: 42 TABLET | Refills: 0 | Status: SHIPPED | OUTPATIENT
Start: 2024-12-24

## 2024-12-24 RX ORDER — DOXYCYCLINE HYCLATE 100 MG
100 TABLET ORAL 2 TIMES DAILY
Qty: 20 TABLET | Refills: 0 | Status: SHIPPED | OUTPATIENT
Start: 2024-12-24 | End: 2025-01-03

## 2024-12-24 RX ORDER — MAGNESIUM SULFATE IN WATER 40 MG/ML
2000 INJECTION, SOLUTION INTRAVENOUS ONCE
Status: COMPLETED | OUTPATIENT
Start: 2024-12-24 | End: 2024-12-24

## 2024-12-24 RX ORDER — IPRATROPIUM BROMIDE AND ALBUTEROL SULFATE 2.5; .5 MG/3ML; MG/3ML
1 SOLUTION RESPIRATORY (INHALATION) ONCE
Status: COMPLETED | OUTPATIENT
Start: 2024-12-24 | End: 2024-12-24

## 2024-12-24 RX ADMIN — WATER 125 MG: 1 INJECTION INTRAMUSCULAR; INTRAVENOUS; SUBCUTANEOUS at 12:21

## 2024-12-24 RX ADMIN — IPRATROPIUM BROMIDE AND ALBUTEROL SULFATE 1 DOSE: 2.5; .5 SOLUTION RESPIRATORY (INHALATION) at 13:11

## 2024-12-24 RX ADMIN — IPRATROPIUM BROMIDE AND ALBUTEROL SULFATE 1 DOSE: 2.5; .5 SOLUTION RESPIRATORY (INHALATION) at 12:06

## 2024-12-24 RX ADMIN — MAGNESIUM SULFATE HEPTAHYDRATE 2000 MG: 40 INJECTION, SOLUTION INTRAVENOUS at 12:23

## 2024-12-24 ASSESSMENT — LIFESTYLE VARIABLES
HOW MANY STANDARD DRINKS CONTAINING ALCOHOL DO YOU HAVE ON A TYPICAL DAY: PATIENT DOES NOT DRINK
HOW OFTEN DO YOU HAVE A DRINK CONTAINING ALCOHOL: NEVER

## 2024-12-24 ASSESSMENT — PAIN - FUNCTIONAL ASSESSMENT: PAIN_FUNCTIONAL_ASSESSMENT: NONE - DENIES PAIN

## 2024-12-24 NOTE — DISCHARGE INSTRUCTIONS
Use medications as directed.  Return to the emergency department if your condition worsens.  Follow-up with your doctors as soon as possible for recheck.

## 2024-12-24 NOTE — ED PROVIDER NOTES
Emergency Department Encounter  Location: [facility] EMERGENCY DEPARTMENT    Patient: Loco Dougherty  MRN: [id number]  : 1945  Date of evaluation: 2024  ED Provider: Jair Mosley DO, FACEP    Chief Complaint:    Shortness of Breath, Cough, and Wheezing (Increasing SOB and cough for about a week. Friday started prednisone. Last dose this morning. )    [place]:  Loco Dougherty is a 79 y.o. male that presents to the emergency department with an ongoing history of COPD presents to the emergency department with shortness of breath.  The patient states he is had a cough and is feeling like he is wheezing.  He saw his primary care doctor last week and had an x-ray ordered.  He does not know the results of this x-ray.  He uses albuterol inhalers and Symbicort inhalers.  He uses DuoNeb treatments 4 times daily.  He states in spite of these treatments his breathing is worsening.  He feels short of breath and he feels as if his disease is not controlled.  He saw his pulmonologist about a month ago and no changes were made.  He states his family doctor put him on prednisone 50 mg daily for 5 days and he finished that today but he states \"it does not seem like it is helping\".      ROS - see HPI, below listed is current ROS at time of my eval:  At least 10 systems reviewed and otherwise acutely negative except as in the [place].  General:  No fevers, no chills, no weakness  Eyes:  No recent vison changes, no discharge  ENT:  No sore throat, no nasal congestion, no hearing changes  Cardiovascular:  No chest pain, no palpitations  Respiratory: Positive for shortness of breath cough and wheezing   gastrointestinal:  No pain, no nausea, no vomiting, no diarrhea  Musculoskeletal:  No muscle pain, no joint pain  Skin:  No rash, no pruritis, no easy bruising  Neurologic:  No speech problems, no headache, no extremity numbness, no extremity tingling, no extremity weakness  Psychiatric:  No anxiety  Genitourinary:  No

## 2025-05-03 ENCOUNTER — APPOINTMENT (OUTPATIENT)
Dept: GENERAL RADIOLOGY | Age: 80
End: 2025-05-03
Payer: MEDICARE

## 2025-05-03 ENCOUNTER — HOSPITAL ENCOUNTER (EMERGENCY)
Age: 80
Discharge: HOME | End: 2025-05-03
Attending: STUDENT IN AN ORGANIZED HEALTH CARE EDUCATION/TRAINING PROGRAM
Payer: MEDICARE

## 2025-05-03 VITALS
SYSTOLIC BLOOD PRESSURE: 90 MMHG | WEIGHT: 193 LBS | DIASTOLIC BLOOD PRESSURE: 58 MMHG | TEMPERATURE: 97.7 F | HEIGHT: 64 IN | OXYGEN SATURATION: 94 % | HEART RATE: 82 BPM | RESPIRATION RATE: 23 BRPM | BODY MASS INDEX: 32.95 KG/M2

## 2025-05-03 DIAGNOSIS — J44.1 COPD EXACERBATION (HCC): Primary | ICD-10-CM

## 2025-05-03 LAB
ALBUMIN SERPL-MCNC: 4.3 G/DL (ref 3.4–5)
ALBUMIN/GLOB SERPL: 2.6 {RATIO}
ALP SERPL-CCNC: 110 U/L (ref 40–129)
ALT SERPL-CCNC: 59 U/L (ref 10–40)
ANION GAP SERPL CALCULATED.3IONS-SCNC: 15 MMOL/L (ref 9–17)
AST SERPL-CCNC: 21 U/L (ref 15–37)
BASOPHILS # BLD: 0.1 K/UL
BASOPHILS NFR BLD: 1 % (ref 0–1)
BILIRUB SERPL-MCNC: 0.6 MG/DL (ref 0–1)
BNP SERPL-MCNC: 92 PG/ML (ref 0–450)
BUN SERPL-MCNC: 17 MG/DL (ref 7–20)
CALCIUM SERPL-MCNC: 8.9 MG/DL (ref 8.3–10.6)
CHLORIDE SERPL-SCNC: 105 MMOL/L (ref 99–110)
CO2 SERPL-SCNC: 21 MMOL/L (ref 21–32)
CREAT SERPL-MCNC: 0.8 MG/DL (ref 0.8–1.3)
EOSINOPHIL # BLD: 0.04 K/UL
EOSINOPHILS RELATIVE PERCENT: 0 % (ref 0–3)
ERYTHROCYTE [DISTWIDTH] IN BLOOD BY AUTOMATED COUNT: 13.9 % (ref 11.7–14.9)
GFR, ESTIMATED: 90 ML/MIN/1.73M2
GLUCOSE SERPL-MCNC: 146 MG/DL (ref 74–99)
HCT VFR BLD AUTO: 46.9 % (ref 42–52)
HGB BLD-MCNC: 14.9 G/DL (ref 13.5–18)
IMM GRANULOCYTES # BLD AUTO: 0.18 K/UL
IMM GRANULOCYTES NFR BLD: 1 %
LYMPHOCYTES NFR BLD: 1.23 K/UL
LYMPHOCYTES RELATIVE PERCENT: 9 % (ref 24–44)
MCH RBC QN AUTO: 26.3 PG (ref 27–31)
MCHC RBC AUTO-ENTMCNC: 31.8 G/DL (ref 32–36)
MCV RBC AUTO: 82.7 FL (ref 78–100)
MONOCYTES NFR BLD: 0.65 K/UL
MONOCYTES NFR BLD: 5 % (ref 0–5)
NEUTROPHILS NFR BLD: 85 % (ref 36–66)
NEUTS SEG NFR BLD: 12.11 K/UL
PLATELET # BLD AUTO: 278 K/UL (ref 140–440)
PMV BLD AUTO: 8.6 FL (ref 7.5–11.1)
POTASSIUM SERPL-SCNC: 4.3 MMOL/L (ref 3.5–5.1)
PROT SERPL-MCNC: 5.9 G/DL (ref 6.4–8.2)
RBC # BLD AUTO: 5.67 M/UL (ref 4.6–6.2)
SODIUM SERPL-SCNC: 142 MMOL/L (ref 136–145)
TROPONIN I SERPL HS-MCNC: 23 NG/L (ref 0–22)
WBC OTHER # BLD: 14.3 K/UL (ref 4–10.5)

## 2025-05-03 PROCEDURE — 96374 THER/PROPH/DIAG INJ IV PUSH: CPT

## 2025-05-03 PROCEDURE — 2500000003 HC RX 250 WO HCPCS: Performed by: STUDENT IN AN ORGANIZED HEALTH CARE EDUCATION/TRAINING PROGRAM

## 2025-05-03 PROCEDURE — 6360000002 HC RX W HCPCS: Performed by: STUDENT IN AN ORGANIZED HEALTH CARE EDUCATION/TRAINING PROGRAM

## 2025-05-03 PROCEDURE — 6370000000 HC RX 637 (ALT 250 FOR IP): Performed by: STUDENT IN AN ORGANIZED HEALTH CARE EDUCATION/TRAINING PROGRAM

## 2025-05-03 PROCEDURE — 71045 X-RAY EXAM CHEST 1 VIEW: CPT

## 2025-05-03 PROCEDURE — 84484 ASSAY OF TROPONIN QUANT: CPT

## 2025-05-03 PROCEDURE — 83880 ASSAY OF NATRIURETIC PEPTIDE: CPT

## 2025-05-03 PROCEDURE — 93005 ELECTROCARDIOGRAM TRACING: CPT | Performed by: STUDENT IN AN ORGANIZED HEALTH CARE EDUCATION/TRAINING PROGRAM

## 2025-05-03 PROCEDURE — 85025 COMPLETE CBC W/AUTO DIFF WBC: CPT

## 2025-05-03 PROCEDURE — 94664 DEMO&/EVAL PT USE INHALER: CPT

## 2025-05-03 PROCEDURE — 99285 EMERGENCY DEPT VISIT HI MDM: CPT

## 2025-05-03 PROCEDURE — 80053 COMPREHEN METABOLIC PANEL: CPT

## 2025-05-03 PROCEDURE — 94640 AIRWAY INHALATION TREATMENT: CPT

## 2025-05-03 RX ORDER — FLUTICASONE PROPIONATE AND SALMETEROL 500; 50 UG/1; UG/1
1 POWDER RESPIRATORY (INHALATION) PRN
COMMUNITY

## 2025-05-03 RX ORDER — DOXYCYCLINE HYCLATE 100 MG
100 TABLET ORAL ONCE
Status: COMPLETED | OUTPATIENT
Start: 2025-05-03 | End: 2025-05-03

## 2025-05-03 RX ORDER — DOXYCYCLINE HYCLATE 100 MG
100 TABLET ORAL 2 TIMES DAILY
Qty: 14 TABLET | Refills: 0 | Status: SHIPPED | OUTPATIENT
Start: 2025-05-03 | End: 2025-05-10

## 2025-05-03 RX ORDER — IPRATROPIUM BROMIDE AND ALBUTEROL SULFATE 2.5; .5 MG/3ML; MG/3ML
3 SOLUTION RESPIRATORY (INHALATION) ONCE
Status: COMPLETED | OUTPATIENT
Start: 2025-05-03 | End: 2025-05-03

## 2025-05-03 RX ADMIN — IPRATROPIUM BROMIDE AND ALBUTEROL SULFATE 3 DOSE: .5; 3 SOLUTION RESPIRATORY (INHALATION) at 10:50

## 2025-05-03 RX ADMIN — CEFTRIAXONE SODIUM 1000 MG: 1 INJECTION, POWDER, FOR SOLUTION INTRAMUSCULAR; INTRAVENOUS at 11:05

## 2025-05-03 RX ADMIN — DOXYCYCLINE HYCLATE 100 MG: 100 TABLET, FILM COATED ORAL at 11:05

## 2025-05-03 ASSESSMENT — PAIN DESCRIPTION - LOCATION: LOCATION: SHOULDER

## 2025-05-03 ASSESSMENT — PAIN DESCRIPTION - ORIENTATION: ORIENTATION: LEFT

## 2025-05-03 ASSESSMENT — PAIN DESCRIPTION - PAIN TYPE: TYPE: ACUTE PAIN

## 2025-05-03 ASSESSMENT — PAIN SCALES - GENERAL: PAINLEVEL_OUTOF10: 3

## 2025-05-03 ASSESSMENT — PAIN DESCRIPTION - DESCRIPTORS: DESCRIPTORS: SHARP

## 2025-05-03 ASSESSMENT — PAIN - FUNCTIONAL ASSESSMENT
PAIN_FUNCTIONAL_ASSESSMENT: 0-10
PAIN_FUNCTIONAL_ASSESSMENT: PREVENTS OR INTERFERES SOME ACTIVE ACTIVITIES AND ADLS

## 2025-05-03 NOTE — ED PROVIDER NOTES
Emergency Department Encounter    Patient: Loco Dougherty  MRN: [id number]  : 1945  Date of Evaluation: 5/3/2025  ED Provider:  Flo Elaine MD    Triage Chief Complaint:   Shortness of Breath (Increased last week, started prednisone dose pack on  which normally helps. No oxygen at home. )    [place]:  Loco Dougherty is a 80 y.o. male with history significant for COPD, chronic back pain, that presents for shortness of breath.  The patient endorses about 5 days of the insidious onset of shortness of breath, something that has been present daily, has progressed from mild to moderate, is constant, not improved by anything, not worsened by exertion, and has been associated with mild chest discomfort, intermittent, left-sided, nonradiating, not present at this time, occasionally worsens with deep inspiration.  The patient endorses chronic cough, not significantly worsened compared with prior, and associated with the production of moderate amount of sputum, described as clear to yellowish.  No exertional chest pain.  No rhinorrhea, congestion, sore throat.  No fevers or chills.  No nausea or emesis, no other GI symptoms.  No abdominal pain.  No urinary abnormalities.  No lower extremity edema, tenderness in calves, rashes or wounds.  The patient has been taking prednisone for about 5 days, and endorses that for the last 3 days, has perceived worsening of his symptoms after he initially had mild improvement.    ROS - see HPI, below listed is current ROS at time of my eval:  Systems reviewed and negative except as above.     Past Medical History:   Diagnosis Date    Chronic back pain     COPD (chronic obstructive pulmonary disease) (HCC)     Emphysema of lung (HCC)     Pneumonia      Past Surgical History:   Procedure Laterality Date    CHOLECYSTECTOMY       No family history on file.  Social History     Socioeconomic History    Marital status:      Spouse name: Not on file    Number of  applicable):  No results found for this visit on 05/03/25.   Radiographs (if obtained):  Radiologist's Report Reviewed:  No results found.        MDM:  CC/HPI Summary, Ddx: Patient that presents for ***, he/she is here in good*** clinical conditions, hemodynamically stable, with no*** signs of inflammatory response and findings in the physical exam as noted above.  Important conditions considered include ***.     Work-up and interventions performed in the ED include:  Labs:   Imaging:   Consults:   Meds/interventions:     Pending work-up results will continue observation in the emergency department***.     Patient in good clinical conditions, at this time there is no need for further interventions in hospital, and the patient will be discharged. Insisted in adherence to medications, recommended follow-up as oupatient, and provided precautions to come back promptly if it becomes necessary.***      ED Course, and Reassessment:            {History from (Optional):47982}    {Limitations to history (Optional):59802}    Patient was given the following medications:  Medications - No data to display    {Social Determinants (Optional):39331}    {Records Reviewed (Optional):83118}      Disposition:   Discharge condition: ***Stable    I am the Primary Clinician of Record.    Clinical Impression:  No diagnosis found.  Disposition referral (if applicable):  No follow-up provider specified.  Disposition medications (if applicable):  New Prescriptions    No medications on file     ED Provider Disposition Time  DISPOSITION                 Comment: Please note this report has been produced using speech recognition software and may contain errors related to that system including errors in grammar, punctuation, and spelling, as well as words and phrases that may be inappropriate.  Efforts were made to edit the dictations.

## 2025-05-03 NOTE — DISCHARGE INSTRUCTIONS
You were seen in the emergency department for shortness of breath.  Here, your vital signs and physical exam was overall reassuring.  Blood work ruled out a heart attack, and an x-ray of your chest ruled out pneumonia.  Your symptoms are consistent with a flare of COPD.    Please continue using steroids as previously indicated.  For antibiotic, we gave you the first dose of the medication doxycycline in the emergency department, and I am prescribing you to continue taking it every 12 hours for the next 7 days.  Please use your albuterol inhaler every 4 hours while awake for the next 2 days; after that, use it only as needed.    Make an appointment to be seen by your primary care doctor next week.    If at some point you have severe shortness of breath, severe nausea or vomiting unable to keep anything down, feels severely weak unable to stand up, feels confused or are lethargic unable to do your normal daily activities, or have any other concerning symptoms, please come back promptly to the emergency department.

## 2025-05-04 LAB
EKG ATRIAL RATE: 83 BPM
EKG DIAGNOSIS: NORMAL
EKG P AXIS: 3 DEGREES
EKG P-R INTERVAL: 142 MS
EKG Q-T INTERVAL: 388 MS
EKG QRS DURATION: 78 MS
EKG QTC CALCULATION (BAZETT): 455 MS
EKG R AXIS: 1 DEGREES
EKG T AXIS: 66 DEGREES
EKG VENTRICULAR RATE: 83 BPM

## 2025-05-04 PROCEDURE — 93010 ELECTROCARDIOGRAM REPORT: CPT | Performed by: INTERNAL MEDICINE

## 2025-05-16 ENCOUNTER — HOSPITAL ENCOUNTER (INPATIENT)
Age: 80
LOS: 2 days | Discharge: HOME | End: 2025-05-18
Attending: EMERGENCY MEDICINE | Admitting: FAMILY MEDICINE
Payer: MEDICARE

## 2025-05-16 ENCOUNTER — APPOINTMENT (OUTPATIENT)
Dept: CT IMAGING | Age: 80
End: 2025-05-16
Payer: MEDICARE

## 2025-05-16 ENCOUNTER — APPOINTMENT (OUTPATIENT)
Dept: GENERAL RADIOLOGY | Age: 80
End: 2025-05-16
Payer: MEDICARE

## 2025-05-16 DIAGNOSIS — R06.89 DYSPNEA AND RESPIRATORY ABNORMALITIES: ICD-10-CM

## 2025-05-16 DIAGNOSIS — J44.1 COPD EXACERBATION (HCC): Primary | ICD-10-CM

## 2025-05-16 DIAGNOSIS — R06.00 DYSPNEA AND RESPIRATORY ABNORMALITIES: ICD-10-CM

## 2025-05-16 DIAGNOSIS — R09.02 HYPOXIA: ICD-10-CM

## 2025-05-16 PROBLEM — J96.01 ACUTE RESPIRATORY FAILURE WITH HYPOXIA (HCC): Status: ACTIVE | Noted: 2025-05-16

## 2025-05-16 LAB
ALBUMIN SERPL-MCNC: 4.1 G/DL (ref 3.4–5)
ALBUMIN/GLOB SERPL: 1.8 {RATIO}
ALP SERPL-CCNC: 114 U/L (ref 40–129)
ALT SERPL-CCNC: 38 U/L (ref 10–40)
ANION GAP SERPL CALCULATED.3IONS-SCNC: 16 MMOL/L (ref 9–17)
AST SERPL-CCNC: 26 U/L (ref 15–37)
BASOPHILS # BLD: 0.11 K/UL
BASOPHILS NFR BLD: 1 % (ref 0–1)
BILIRUB SERPL-MCNC: 0.8 MG/DL (ref 0–1)
BNP SERPL-MCNC: <36 PG/ML (ref 0–450)
BUN SERPL-MCNC: 15 MG/DL (ref 7–20)
CALCIUM SERPL-MCNC: 9 MG/DL (ref 8.3–10.6)
CHLORIDE SERPL-SCNC: 106 MMOL/L (ref 99–110)
CO2 SERPL-SCNC: 21 MMOL/L (ref 21–32)
CREAT SERPL-MCNC: 0.9 MG/DL (ref 0.8–1.3)
D DIMER PPP FEU-MCNC: 0.5 UG/ML FEU (ref 0–0.46)
EKG ATRIAL RATE: 111 BPM
EKG DIAGNOSIS: NORMAL
EKG P AXIS: 61 DEGREES
EKG P-R INTERVAL: 176 MS
EKG Q-T INTERVAL: 340 MS
EKG QRS DURATION: 82 MS
EKG QTC CALCULATION (BAZETT): 462 MS
EKG R AXIS: -5 DEGREES
EKG T AXIS: 78 DEGREES
EKG VENTRICULAR RATE: 111 BPM
EOSINOPHIL # BLD: 0.1 K/UL
EOSINOPHILS RELATIVE PERCENT: 1 % (ref 0–3)
ERYTHROCYTE [DISTWIDTH] IN BLOOD BY AUTOMATED COUNT: 14.4 % (ref 11.7–14.9)
GFR, ESTIMATED: 84 ML/MIN/1.73M2
GLUCOSE SERPL-MCNC: 137 MG/DL (ref 74–99)
HCO3 VENOUS: 22.9 MMOL/L (ref 22–29)
HCO3 VENOUS: 27.8 MMOL/L (ref 22–29)
HCT VFR BLD AUTO: 47 % (ref 42–52)
HGB BLD-MCNC: 14.6 G/DL (ref 13.5–18)
IMM GRANULOCYTES # BLD AUTO: 0.07 K/UL
IMM GRANULOCYTES NFR BLD: 0 %
LACTATE BLDV-SCNC: 2.1 MMOL/L (ref 0.4–2)
LACTATE BLDV-SCNC: 2.2 MMOL/L (ref 0.4–2)
LYMPHOCYTES NFR BLD: 1.22 K/UL
LYMPHOCYTES RELATIVE PERCENT: 7 % (ref 24–44)
MCH RBC QN AUTO: 26.1 PG (ref 27–31)
MCHC RBC AUTO-ENTMCNC: 31.1 G/DL (ref 32–36)
MCV RBC AUTO: 84.1 FL (ref 78–100)
MONOCYTES NFR BLD: 1.12 K/UL
MONOCYTES NFR BLD: 7 % (ref 0–5)
NEGATIVE BASE EXCESS, VEN: 1.4 MMOL/L (ref 0–3)
NEUTROPHILS NFR BLD: 85 % (ref 36–66)
NEUTS SEG NFR BLD: 14.45 K/UL
O2 SAT, VEN: 93.9 % (ref 34–95)
O2 SAT, VEN: 99.4 % (ref 34–95)
PCO2 VENOUS: 36.7 MM HG (ref 41–51)
PCO2 VENOUS: 47.4 MM HG (ref 41–51)
PH VENOUS: 7.38 (ref 7.32–7.43)
PH VENOUS: 7.4 (ref 7.32–7.43)
PLATELET # BLD AUTO: 236 K/UL (ref 140–440)
PMV BLD AUTO: 8.9 FL (ref 7.5–11.1)
PO2 VENOUS: 154.6 MM HG (ref 28–48)
PO2 VENOUS: 73.1 MM HG (ref 28–48)
POSITIVE BASE EXCESS, VEN: 1.8 MMOL/L (ref 0–3)
POTASSIUM SERPL-SCNC: 3.8 MMOL/L (ref 3.5–5.1)
PROT SERPL-MCNC: 6.4 G/DL (ref 6.4–8.2)
RBC # BLD AUTO: 5.59 M/UL (ref 4.6–6.2)
SODIUM SERPL-SCNC: 142 MMOL/L (ref 136–145)
TCO2 CALC VENOUS: 24 MMOL/L (ref 21–32)
TCO2 CALC VENOUS: 29 MMOL/L (ref 21–32)
TROPONIN I SERPL HS-MCNC: 30 NG/L (ref 0–22)
TROPONIN I SERPL HS-MCNC: 39 NG/L (ref 0–22)
WBC OTHER # BLD: 17.1 K/UL (ref 4–10.5)

## 2025-05-16 PROCEDURE — 6370000000 HC RX 637 (ALT 250 FOR IP): Performed by: EMERGENCY MEDICINE

## 2025-05-16 PROCEDURE — 87040 BLOOD CULTURE FOR BACTERIA: CPT

## 2025-05-16 PROCEDURE — 96375 TX/PRO/DX INJ NEW DRUG ADDON: CPT

## 2025-05-16 PROCEDURE — 83880 ASSAY OF NATRIURETIC PEPTIDE: CPT

## 2025-05-16 PROCEDURE — 85025 COMPLETE CBC W/AUTO DIFF WBC: CPT

## 2025-05-16 PROCEDURE — 82803 BLOOD GASES ANY COMBINATION: CPT

## 2025-05-16 PROCEDURE — 71275 CT ANGIOGRAPHY CHEST: CPT

## 2025-05-16 PROCEDURE — 2500000003 HC RX 250 WO HCPCS: Performed by: EMERGENCY MEDICINE

## 2025-05-16 PROCEDURE — 2700000000 HC OXYGEN THERAPY PER DAY

## 2025-05-16 PROCEDURE — 6360000004 HC RX CONTRAST MEDICATION: Performed by: FAMILY MEDICINE

## 2025-05-16 PROCEDURE — 2580000003 HC RX 258: Performed by: EMERGENCY MEDICINE

## 2025-05-16 PROCEDURE — 94640 AIRWAY INHALATION TREATMENT: CPT

## 2025-05-16 PROCEDURE — 2580000003 HC RX 258: Performed by: NURSE PRACTITIONER

## 2025-05-16 PROCEDURE — 99285 EMERGENCY DEPT VISIT HI MDM: CPT

## 2025-05-16 PROCEDURE — 93005 ELECTROCARDIOGRAM TRACING: CPT | Performed by: EMERGENCY MEDICINE

## 2025-05-16 PROCEDURE — 94761 N-INVAS EAR/PLS OXIMETRY MLT: CPT

## 2025-05-16 PROCEDURE — 36415 COLL VENOUS BLD VENIPUNCTURE: CPT

## 2025-05-16 PROCEDURE — 80053 COMPREHEN METABOLIC PANEL: CPT

## 2025-05-16 PROCEDURE — 93010 ELECTROCARDIOGRAM REPORT: CPT | Performed by: INTERNAL MEDICINE

## 2025-05-16 PROCEDURE — 6360000002 HC RX W HCPCS: Performed by: EMERGENCY MEDICINE

## 2025-05-16 PROCEDURE — 87899 AGENT NOS ASSAY W/OPTIC: CPT

## 2025-05-16 PROCEDURE — 83605 ASSAY OF LACTIC ACID: CPT

## 2025-05-16 PROCEDURE — 6370000000 HC RX 637 (ALT 250 FOR IP): Performed by: NURSE PRACTITIONER

## 2025-05-16 PROCEDURE — 94150 VITAL CAPACITY TEST: CPT

## 2025-05-16 PROCEDURE — 84484 ASSAY OF TROPONIN QUANT: CPT

## 2025-05-16 PROCEDURE — 96365 THER/PROPH/DIAG IV INF INIT: CPT

## 2025-05-16 PROCEDURE — 6360000002 HC RX W HCPCS: Performed by: NURSE PRACTITIONER

## 2025-05-16 PROCEDURE — 71045 X-RAY EXAM CHEST 1 VIEW: CPT

## 2025-05-16 PROCEDURE — 85379 FIBRIN DEGRADATION QUANT: CPT

## 2025-05-16 PROCEDURE — 1200000000 HC SEMI PRIVATE

## 2025-05-16 PROCEDURE — 87449 NOS EACH ORGANISM AG IA: CPT

## 2025-05-16 RX ORDER — IPRATROPIUM BROMIDE AND ALBUTEROL SULFATE 2.5; .5 MG/3ML; MG/3ML
1 SOLUTION RESPIRATORY (INHALATION) ONCE
Status: COMPLETED | OUTPATIENT
Start: 2025-05-16 | End: 2025-05-16

## 2025-05-16 RX ORDER — SENNOSIDES 8.6 MG/1
1 TABLET ORAL DAILY PRN
Status: DISCONTINUED | OUTPATIENT
Start: 2025-05-16 | End: 2025-05-18 | Stop reason: HOSPADM

## 2025-05-16 RX ORDER — SODIUM CHLORIDE 9 MG/ML
INJECTION, SOLUTION INTRAVENOUS CONTINUOUS
Status: DISPENSED | OUTPATIENT
Start: 2025-05-16 | End: 2025-05-17

## 2025-05-16 RX ORDER — BENZONATATE 100 MG/1
100 CAPSULE ORAL ONCE
Status: COMPLETED | OUTPATIENT
Start: 2025-05-16 | End: 2025-05-16

## 2025-05-16 RX ORDER — SODIUM CHLORIDE 9 MG/ML
INJECTION, SOLUTION INTRAVENOUS PRN
Status: DISCONTINUED | OUTPATIENT
Start: 2025-05-16 | End: 2025-05-18 | Stop reason: HOSPADM

## 2025-05-16 RX ORDER — SODIUM CHLORIDE 0.9 % (FLUSH) 0.9 %
5-40 SYRINGE (ML) INJECTION PRN
Status: DISCONTINUED | OUTPATIENT
Start: 2025-05-16 | End: 2025-05-18 | Stop reason: HOSPADM

## 2025-05-16 RX ORDER — GUAIFENESIN 200 MG/10ML
200 LIQUID ORAL ONCE
Status: COMPLETED | OUTPATIENT
Start: 2025-05-16 | End: 2025-05-16

## 2025-05-16 RX ORDER — SODIUM CHLORIDE 0.9 % (FLUSH) 0.9 %
5-40 SYRINGE (ML) INJECTION EVERY 12 HOURS SCHEDULED
Status: DISCONTINUED | OUTPATIENT
Start: 2025-05-16 | End: 2025-05-18 | Stop reason: HOSPADM

## 2025-05-16 RX ORDER — IOPAMIDOL 755 MG/ML
100 INJECTION, SOLUTION INTRAVASCULAR
Status: COMPLETED | OUTPATIENT
Start: 2025-05-16 | End: 2025-05-16

## 2025-05-16 RX ORDER — 0.9 % SODIUM CHLORIDE 0.9 %
1000 INTRAVENOUS SOLUTION INTRAVENOUS ONCE
Status: COMPLETED | OUTPATIENT
Start: 2025-05-16 | End: 2025-05-16

## 2025-05-16 RX ORDER — ONDANSETRON 4 MG/1
4 TABLET, ORALLY DISINTEGRATING ORAL EVERY 8 HOURS PRN
Status: DISCONTINUED | OUTPATIENT
Start: 2025-05-16 | End: 2025-05-18 | Stop reason: HOSPADM

## 2025-05-16 RX ORDER — LEVOFLOXACIN 500 MG/1
500 TABLET, FILM COATED ORAL DAILY
Status: DISCONTINUED | OUTPATIENT
Start: 2025-05-17 | End: 2025-05-17

## 2025-05-16 RX ORDER — GUAIFENESIN 600 MG/1
600 TABLET, EXTENDED RELEASE ORAL 2 TIMES DAILY
Status: DISCONTINUED | OUTPATIENT
Start: 2025-05-17 | End: 2025-05-18 | Stop reason: HOSPADM

## 2025-05-16 RX ORDER — ONDANSETRON 2 MG/ML
4 INJECTION INTRAMUSCULAR; INTRAVENOUS EVERY 6 HOURS PRN
Status: DISCONTINUED | OUTPATIENT
Start: 2025-05-16 | End: 2025-05-18 | Stop reason: HOSPADM

## 2025-05-16 RX ORDER — ENOXAPARIN SODIUM 100 MG/ML
40 INJECTION SUBCUTANEOUS DAILY
Status: DISCONTINUED | OUTPATIENT
Start: 2025-05-16 | End: 2025-05-18 | Stop reason: HOSPADM

## 2025-05-16 RX ORDER — GUAIFENESIN 400 MG/1
400 TABLET ORAL DAILY
Status: DISCONTINUED | OUTPATIENT
Start: 2025-05-17 | End: 2025-05-16

## 2025-05-16 RX ORDER — PREDNISONE 20 MG/1
40 TABLET ORAL DAILY
Status: DISCONTINUED | OUTPATIENT
Start: 2025-05-17 | End: 2025-05-18 | Stop reason: HOSPADM

## 2025-05-16 RX ORDER — IPRATROPIUM BROMIDE AND ALBUTEROL SULFATE 2.5; .5 MG/3ML; MG/3ML
1 SOLUTION RESPIRATORY (INHALATION)
Status: DISCONTINUED | OUTPATIENT
Start: 2025-05-16 | End: 2025-05-18 | Stop reason: HOSPADM

## 2025-05-16 RX ORDER — ACETAMINOPHEN 325 MG/1
650 TABLET ORAL EVERY 6 HOURS PRN
Status: DISCONTINUED | OUTPATIENT
Start: 2025-05-16 | End: 2025-05-18 | Stop reason: HOSPADM

## 2025-05-16 RX ORDER — LEVOFLOXACIN 5 MG/ML
500 INJECTION, SOLUTION INTRAVENOUS ONCE
Status: COMPLETED | OUTPATIENT
Start: 2025-05-16 | End: 2025-05-16

## 2025-05-16 RX ORDER — BUDESONIDE AND FORMOTEROL FUMARATE DIHYDRATE 160; 4.5 UG/1; UG/1
2 AEROSOL RESPIRATORY (INHALATION) 2 TIMES DAILY
Status: DISCONTINUED | OUTPATIENT
Start: 2025-05-16 | End: 2025-05-18 | Stop reason: HOSPADM

## 2025-05-16 RX ORDER — ACETAMINOPHEN 650 MG/1
650 SUPPOSITORY RECTAL EVERY 6 HOURS PRN
Status: DISCONTINUED | OUTPATIENT
Start: 2025-05-16 | End: 2025-05-18 | Stop reason: HOSPADM

## 2025-05-16 RX ADMIN — IPRATROPIUM BROMIDE AND ALBUTEROL SULFATE 1 DOSE: 2.5; .5 SOLUTION RESPIRATORY (INHALATION) at 19:35

## 2025-05-16 RX ADMIN — IPRATROPIUM BROMIDE AND ALBUTEROL SULFATE 1 DOSE: 2.5; .5 SOLUTION RESPIRATORY (INHALATION) at 12:40

## 2025-05-16 RX ADMIN — IPRATROPIUM BROMIDE AND ALBUTEROL SULFATE 1 DOSE: 2.5; .5 SOLUTION RESPIRATORY (INHALATION) at 09:09

## 2025-05-16 RX ADMIN — SODIUM CHLORIDE 1000 ML: 0.9 INJECTION, SOLUTION INTRAVENOUS at 09:32

## 2025-05-16 RX ADMIN — BENZONATATE 100 MG: 100 CAPSULE ORAL at 09:36

## 2025-05-16 RX ADMIN — ENOXAPARIN SODIUM 40 MG: 100 INJECTION SUBCUTANEOUS at 13:51

## 2025-05-16 RX ADMIN — IPRATROPIUM BROMIDE AND ALBUTEROL SULFATE 1 DOSE: 2.5; .5 SOLUTION RESPIRATORY (INHALATION) at 16:29

## 2025-05-16 RX ADMIN — WATER 125 MG: 1 INJECTION INTRAMUSCULAR; INTRAVENOUS; SUBCUTANEOUS at 09:33

## 2025-05-16 RX ADMIN — LEVOFLOXACIN 500 MG: 5 INJECTION, SOLUTION INTRAVENOUS at 10:36

## 2025-05-16 RX ADMIN — SODIUM CHLORIDE: 0.9 INJECTION, SOLUTION INTRAVENOUS at 18:02

## 2025-05-16 RX ADMIN — IOPAMIDOL 100 ML: 755 INJECTION, SOLUTION INTRAVENOUS at 16:07

## 2025-05-16 RX ADMIN — GUAIFENESIN 200 MG: 100 LIQUID ORAL at 09:36

## 2025-05-16 RX ADMIN — BUDESONIDE AND FORMOTEROL FUMARATE DIHYDRATE 2 PUFF: 160; 4.5 AEROSOL RESPIRATORY (INHALATION) at 19:35

## 2025-05-16 ASSESSMENT — LIFESTYLE VARIABLES
HOW OFTEN DO YOU HAVE A DRINK CONTAINING ALCOHOL: NEVER
HOW MANY STANDARD DRINKS CONTAINING ALCOHOL DO YOU HAVE ON A TYPICAL DAY: PATIENT DOES NOT DRINK

## 2025-05-16 NOTE — H&P
V2.0  History and Physical      Name:  Loco Dougherty /Age/Sex: 1945  (80 y.o. male)   MRN & CSN:  [id number] & [id number] Encounter Date/Time: 2025 11:21 AM EDT   Location:   PCP: Maverick Noble MD       Hospital Day: 1    Assessment and Plan:   Loco Dougherty is a 80 y.o. male who presents with Acute respiratory failure with hypoxia (HCC)    Hospital Problems           Last Modified POA    * (Principal) Acute respiratory failure with hypoxia (HCC) 2025 Yes       Plan:    Acute respiratory failure with hypoxia  Acute COPD exacerbation  Severe sepsis   -no evidence of hemodynamic instability  -Criteria met admission, lactic acidosis (2.1), tachycardia, leukocytosis (17.1)  -CXR nonacute  -VBG 7.337  -Pending repeat lactic acid  -If D-dimer elevated and remains tachycardic, will obtain CTA chest rule out PE  -Bronchodilators/ICS  -Pulmonary hygiene  -Pending cultures/pneumonia PCR  -Steroids: Prednisone  -Antibiotics: Levaquin  -Dispo: Admit inpatient    Elevated troponin  Flat declining trend.  EKG without acute ischemic changes-sinus tachycardia  Likely in setting of hypoxia  Low suspicion for ACS    Hypertension   - uncontrolled trends on admit   - Hx noted per chart review but no current medications   -Trends appear controlled     Obesity  - BMI 31.61  - Diet/activity as tolerated        Disposition:   Current Living situation: Home  Expected Disposition: Home  Estimated D/C:     Diet ADULT DIET; Regular   DVT Prophylaxis [x] Lovenox, []  Heparin, [] SCDs, [] Ambulation,  [] Eliquis, [] Xarelto, [] Coumadin   Code Status Full Code   Surrogate Decision Maker/ POA Spouse      Personally reviewed Lab Studies and Imaging     Discussed management of the case with ER barber who recommended admission     EKG interpreted personally and results ST    Imaging that was interpreted personally includes CXR and results as noted above    Drugs that require monitoring for toxicity include  Daily    [START ON 5/17/2025] predniSONE  40 mg Oral Daily    [START ON 5/17/2025] guaiFENesin  600 mg Oral BID      Infusions:    sodium chloride       PRN Meds: sodium chloride flush, 5-40 mL, PRN  sodium chloride, , PRN  ondansetron, 4 mg, Q8H PRN   Or  ondansetron, 4 mg, Q6H PRN  senna, 1 tablet, Daily PRN  acetaminophen, 650 mg, Q6H PRN   Or  acetaminophen, 650 mg, Q6H PRN        Labs      CBC:   Recent Labs     05/16/25  0855   WBC 17.1*   HGB 14.6        BMP:    Recent Labs     05/16/25  0855      K 3.8      CO2 21   BUN 15   CREATININE 0.9   GLUCOSE 137*     Hepatic:   Recent Labs     05/16/25  0855   AST 26   ALT 38   BILITOT 0.8   ALKPHOS 114     Lipids:   Lab Results   Component Value Date/Time    CHOL 198 11/18/2023 04:48 AM    HDL 64 11/18/2023 04:48 AM    TRIG 90 11/18/2023 04:48 AM     Hemoglobin A1C:   Lab Results   Component Value Date/Time    LABA1C 5.7 05/22/2022 07:00 PM     TSH: No results found for: \"TSH\"  Troponin:   Lab Results   Component Value Date/Time    TROPONINT <0.010 05/21/2022 07:10 PM    TROPONINT <0.010 12/27/2019 04:58 PM    TROPONINT <0.010 10/13/2019 08:20 AM     Lactic Acid: No results for input(s): \"LACTA\" in the last 72 hours.  BNP:   Recent Labs     05/16/25  0855   PROBNP <36     UA:  Lab Results   Component Value Date/Time    NITRU NEGATIVE 11/18/2023 03:15 PM    COLORU YELLOW 11/18/2023 03:15 PM    PHUR 6.0 11/18/2023 03:15 PM    WBCUA 0 05/21/2022 08:30 PM    RBCUA 0 05/21/2022 08:30 PM    BACTERIA 0 05/21/2022 08:30 PM    CLARITYU CLEAR 11/18/2023 03:15 PM    LEUKOCYTESUR NEGATIVE 11/18/2023 03:15 PM    UROBILINOGEN 0.2 11/18/2023 03:15 PM    BILIRUBINUR NEGATIVE 11/18/2023 03:15 PM    BLOODU NEGATIVE 11/18/2023 03:15 PM    GLUCOSEU 250 11/18/2023 03:15 PM    KETUA TRACE 11/18/2023 03:15 PM     Urine Cultures: No results found for: \"LABURIN\"  Blood Cultures: No results found for: \"BC\"  No results found for: \"BLOODCULT2\"  Organism: No results found

## 2025-05-16 NOTE — PROGRESS NOTES
.4 Eyes Skin Assessment     NAME:  Loco Dougherty  YOB: 1945  MEDICAL RECORD NUMBER:  [id number]    The patient is being assessed for  Admission    I agree that at least one RN has performed a thorough Head to Toe Skin Assessment on the patient. ALL assessment sites listed below have been assessed.      Areas assessed by both nurses:    Head, Face, Ears, Shoulders, Back, Chest, Arms, Elbows, Hands, Sacrum. Buttock, Coccyx, Ischium, and Legs. Feet and Heels        Does the Patient have a Wound? No noted wound(s)       Suleiman Prevention initiated by RN: No  Wound Care Orders initiated by RN: No    Pressure Injury (Stage 3,4, Unstageable, DTI, NWPT, and Complex wounds) if present, place Wound referral order by RN under : No    New Ostomies, if present place, Ostomy referral order under : No     Nurse 1 eSignature: Electronically signed by Saida Allen RN on 5/16/25 at 12:41 PM EDT    **SHARE this note so that the co-signing nurse can place an eSignature**    Nurse 2 eSignature: Electronically signed by Sadaf Arrieta RN on 5/16/25 at 2:56 PM EDT

## 2025-05-16 NOTE — ED NOTES
Pt tripoding over the bed, sob, states was seen in ed 3 weeks ago but was only better for a few days and then sob increased.  Pt placed on pulse ox, saturation 85-89%, 4 LNC placed on pt.  Pt able to sit up in bed.

## 2025-05-16 NOTE — ED NOTES
Pt continues to sit on side of bed but states he is feeling much better now, oxygen 100% on 4 LNC.

## 2025-05-16 NOTE — CARE COORDINATION
05/16/25 1155   Service Assessment   Patient Orientation Alert and Oriented   Cognition Alert   History Provided By Patient   Primary Caregiver Self   Support Systems Spouse/Significant Other   Patient's Healthcare Decision Maker is: Patient Declined (Legal Next of Kin Remains as Decision Maker)   PCP Verified by CM Yes   Prior Functional Level Independent in ADLs/IADLs   Current Functional Level Independent in ADLs/IADLs   Can patient return to prior living arrangement Yes   Ability to make needs known: Good   Family able to assist with home care needs: Yes   Would you like for me to discuss the discharge plan with any other family members/significant others, and if so, who? No   Financial Resources Medicare   Community Resources None   Social/Functional History   Lives With Spouse   Type of Home House   Home Equipment None   Receives Help From Other (Comment)  (Independent)   Active  Yes   Occupation Full time employment   Type of Occupation    Discharge Planning   Type of Residence House   Potential Assistance Needed N/A   Patient expects to be discharged to: House   Services At/After Discharge   Services At/After Discharge None   Mode of Transport at Discharge Self   Confirm Follow Up Transport Self   Condition of Participation: Discharge Planning   The Plan for Transition of Care is related to the following treatment goals: Plans to return home   The Patient and/or Patient Representative was provided with a Choice of Provider? Patient   The Patient and/Or Patient Representative agree with the Discharge Plan? Yes   Freedom of Choice list was provided with basic dialogue that supports the patient's individualized plan of care/goals, treatment preferences, and shares the quality data associated with the providers?  Yes     CM met with the patient to initiate discharge planning.  Patient lives at home with his wife, has medical coverage & PCP, is independent with ADLs, is still

## 2025-05-16 NOTE — ED NOTES
Pt sitting up tripoding over table, pt states he is struggling, noticed patient's oxygen level 92% but oxygen was left off after breathing treatment.  Pt placed back on 4 LNC.

## 2025-05-16 NOTE — ED NOTES
ED TO INPATIENT SBAR HANDOFF    Patient Name: Loco Dougherty   :  1945  80 y.o.   Preferred Name Cheng   Family/Caregiver Present yes   Restraints no   C-SSRS: Risk of Suicide: No Risk  Sitter no   Sepsis Risk Score        Situation  Chief Complaint   Patient presents with    Shortness of Breath     Worse since yesterday      Brief Description of Patient's Condition: )Pt alert and oriented, hx of COPD, arrives tripoding with complaints of sob.  Was seen in ed 3 weeks ago and only felt better a few days on discharge.  Pt saturation 87% on room air, pt placed on 4 LNC.  After breathing treatment pt taken off oxygen and pt soon became sob and sitting up leaning over table d/t sob.  Pt placed back on 0xygen and pt soon recovered.  Mental Status: oriented  Arrived from: home    Imaging:   XR CHEST PORTABLE   Final Result        Abnormal labs:   Abnormal Labs Reviewed   CBC WITH AUTO DIFFERENTIAL - Abnormal; Notable for the following components:       Result Value    WBC 17.1 (*)     MCH 26.1 (*)     MCHC 31.1 (*)     Neutrophils % 85 (*)     Lymphocytes % 7 (*)     Monocytes % 7 (*)     All other components within normal limits   TROPONIN - Abnormal; Notable for the following components:    Troponin, High Sensitivity 39 (*)     All other components within normal limits   LACTATE, SEPSIS - Abnormal; Notable for the following components:    Lactic Acid, Sepsis 2.1 (*)     All other components within normal limits   COMPREHENSIVE METABOLIC PANEL W/ REFLEX TO MG FOR LOW K - Abnormal; Notable for the following components:    Glucose 137 (*)     All other components within normal limits   POC-BLOOD GASES VENOUS - Abnormal; Notable for the following components:    PO2, Natalio 73.1 (*)     All other components within normal limits   TROPONIN - Abnormal; Notable for the following components:    Troponin, High Sensitivity 30 (*)     All other components within normal limits        Background  History:   Past Medical History:

## 2025-05-16 NOTE — ED PROVIDER NOTES
[facility]      TRIAGE CHIEF COMPLAINT:   Shortness of Breath (Worse since yesterday )      [place]:  Loco Dougherty is a 80 y.o. male that presents with complaint of dyspnea.  Patient has known emphysema COPD.  Has been using his breathing treatments at home and has been on steroids and not getting better.  Here recently for the same thing.  Still with home O2.  Patient started getting more short of breath and coughing since yesterday.  Upon arrival he is tachycardic hypoxic 85%.  No chest pain just cough shortness of breath.  He has a chronic cough.  That is not new.  No swelling no history of heart issues denies any history of DVT, PE, AAA again no chest pain abdominal pain.  Patient tachypneic on arrival tachycardic hypoxic workup initiated.    REVIEW OF SYSTEMS:  At least 10 systems reviewed and otherwise acutely negative except as in the [place].    Review of Systems   Constitutional: Negative.    HENT: Negative.     Eyes: Negative.    Respiratory:  Positive for cough, shortness of breath and wheezing.    Cardiovascular: Negative.    Gastrointestinal: Negative.    Endocrine: Negative.    Genitourinary: Negative.    Musculoskeletal: Negative.    Skin: Negative.    Allergic/Immunologic: Negative.    Neurological: Negative.    Hematological: Negative.    Psychiatric/Behavioral: Negative.     All other systems reviewed and are negative.      Past Medical History:   Diagnosis Date    Chronic back pain     COPD (chronic obstructive pulmonary disease) (HCC)     Emphysema of lung (HCC)     Pneumonia      Past Surgical History:   Procedure Laterality Date    CHOLECYSTECTOMY       No family history on file.  Social History     Socioeconomic History    Marital status:      Spouse name: Not on file    Number of children: Not on file    Years of education: Not on file    Highest education level: Not on file   Occupational History    Not on file   Tobacco Use    Smoking status: Former      84 >60 mL/min/1.73m2    Calcium 9.0 8.3 - 10.6 mg/dL    Total Protein 6.4 6.4 - 8.2 g/dL    Albumin 4.1 3.4 - 5.0 g/dL    Albumin/Globulin Ratio 1.8     Total Bilirubin 0.8 0.0 - 1.0 mg/dL    Alkaline Phosphatase 114 40 - 129 U/L    ALT 38 10 - 40 U/L    AST 26 15 - 37 U/L   POCT Blood gas, venous   Result Value Ref Range    pH, Natalio 7.377 7.32 - 7.43    pCO2, Natalio 47.4 41.0 - 51.0 mm Hg    PO2, Natalio 73.1 (H) 28.0 - 48.0 mm Hg    HCO3, Venous 27.8 22.0 - 29.0 mmol/L    TC02 (Calc), Natalio 29 21 - 32 mmol/L    Positive Base Excess, Natalio 1.8 0.0 - 3.0 mmol/L    O2 Sat, Natalio 93.9 34.0 - 95.0 %   Troponin   Result Value Ref Range    Troponin, High Sensitivity 30 (H) 0 - 22 ng/L   EKG 12 Lead   Result Value Ref Range    Ventricular Rate 111 BPM    Atrial Rate 111 BPM    P-R Interval 176 ms    QRS Duration 82 ms    Q-T Interval 340 ms    QTc Calculation (Bazett) 462 ms    P Axis 61 degrees    R Axis -5 degrees    T Axis 78 degrees    Diagnosis       Sinus tachycardia  Low voltage QRS  Inferior infarct (cited on or before 22-MAY-2022)  Abnormal ECG  When compared with ECG of 03-MAY-2025 10:33,  Questionable change in initial forces of Inferior leads          Radiographs (if obtained):  [] The following radiograph was interpreted by myself in the absence of a radiologist:  [x] Radiologist's Report Reviewed:    CXR    XR CHEST PORTABLE  Result Date: 5/16/2025  PROCEDURE: XR CHEST PORTABLE DATE OF EXAM:  5/16/2025 9:05 DEMOGRAPHICS: 80 years old Male INDICATION: dyspnea COMPARISON: 5/3/2025 FINDINGS:  Normal heart size. No consolidation. No pneumothorax or pleural fluid. Stable osseous structures. IMPRESSION: 1.  No acute findings.  Dictated and Electronically Signed By: Jasiel Parsons MD [facility] 5/16/2025 9:23        XR CHEST PORTABLE  Result Date: 5/3/2025  AP chest: INDICATION: Shortness of breath DEMOGRAPHICS: 80 years old Male COMPARISON: 5/21/2022 Findings: The cardiac silhouette is within normal limits in

## 2025-05-17 LAB
ALBUMIN SERPL-MCNC: 3.6 G/DL (ref 3.4–5)
ALBUMIN/GLOB SERPL: 1.9 {RATIO}
ALP SERPL-CCNC: 89 U/L (ref 40–129)
ALT SERPL-CCNC: 31 U/L (ref 10–40)
ANION GAP SERPL CALCULATED.3IONS-SCNC: 14 MMOL/L (ref 9–17)
AST SERPL-CCNC: 18 U/L (ref 15–37)
BASOPHILS # BLD: 0 K/UL
BASOPHILS NFR BLD: 0 % (ref 0–1)
BILIRUB SERPL-MCNC: 0.7 MG/DL (ref 0–1)
BUN SERPL-MCNC: 14 MG/DL (ref 7–20)
CALCIUM SERPL-MCNC: 8.3 MG/DL (ref 8.3–10.6)
CHLORIDE SERPL-SCNC: 109 MMOL/L (ref 99–110)
CO2 SERPL-SCNC: 18 MMOL/L (ref 21–32)
CREAT SERPL-MCNC: 0.8 MG/DL (ref 0.8–1.3)
EOSINOPHIL # BLD: 0 K/UL
EOSINOPHILS RELATIVE PERCENT: 0 % (ref 0–3)
ERYTHROCYTE [DISTWIDTH] IN BLOOD BY AUTOMATED COUNT: 14.5 % (ref 11.7–14.9)
GFR, ESTIMATED: 89 ML/MIN/1.73M2
GLUCOSE SERPL-MCNC: 170 MG/DL (ref 74–99)
HCT VFR BLD AUTO: 38.7 % (ref 42–52)
HGB BLD-MCNC: 12.1 G/DL (ref 13.5–18)
L PNEUMO1 AG UR QL IA.RAPID: NEGATIVE
LACTATE BLDV-SCNC: 1.7 MMOL/L (ref 0.4–2)
LACTATE BLDV-SCNC: 2.6 MMOL/L (ref 0.4–2)
LYMPHOCYTES NFR BLD: 0.68 K/UL
LYMPHOCYTES RELATIVE PERCENT: 4 % (ref 24–44)
MCH RBC QN AUTO: 26.3 PG (ref 27–31)
MCHC RBC AUTO-ENTMCNC: 31.3 G/DL (ref 32–36)
MCV RBC AUTO: 84.1 FL (ref 78–100)
MONOCYTES NFR BLD: 0.68 K/UL
MONOCYTES NFR BLD: 4 % (ref 0–5)
NEUTROPHILS NFR BLD: 92 % (ref 36–66)
NEUTS SEG NFR BLD: 15.64 K/UL
PLATELET # BLD AUTO: 217 K/UL (ref 140–440)
PLATELET ESTIMATE: NORMAL
PMV BLD AUTO: 9.3 FL (ref 7.5–11.1)
POTASSIUM SERPL-SCNC: 4.3 MMOL/L (ref 3.5–5.1)
PROCALCITONIN SERPL-MCNC: 0.09 NG/ML
PROT SERPL-MCNC: 5.6 G/DL (ref 6.4–8.2)
RBC # BLD AUTO: 4.6 M/UL (ref 4.6–6.2)
RBC # BLD: NORMAL 10*6/UL
S PNEUM AG SPEC QL: NEGATIVE
SODIUM SERPL-SCNC: 141 MMOL/L (ref 136–145)
SPECIMEN SOURCE: NORMAL
WBC # BLD: NORMAL 10*3/UL
WBC OTHER # BLD: 17 K/UL (ref 4–10.5)

## 2025-05-17 PROCEDURE — 94640 AIRWAY INHALATION TREATMENT: CPT

## 2025-05-17 PROCEDURE — 2580000003 HC RX 258: Performed by: NURSE PRACTITIONER

## 2025-05-17 PROCEDURE — 2500000003 HC RX 250 WO HCPCS: Performed by: NURSE PRACTITIONER

## 2025-05-17 PROCEDURE — 83605 ASSAY OF LACTIC ACID: CPT

## 2025-05-17 PROCEDURE — 94761 N-INVAS EAR/PLS OXIMETRY MLT: CPT

## 2025-05-17 PROCEDURE — 85025 COMPLETE CBC W/AUTO DIFF WBC: CPT

## 2025-05-17 PROCEDURE — 2700000000 HC OXYGEN THERAPY PER DAY

## 2025-05-17 PROCEDURE — 6370000000 HC RX 637 (ALT 250 FOR IP): Performed by: NURSE PRACTITIONER

## 2025-05-17 PROCEDURE — 80053 COMPREHEN METABOLIC PANEL: CPT

## 2025-05-17 PROCEDURE — 36415 COLL VENOUS BLD VENIPUNCTURE: CPT

## 2025-05-17 PROCEDURE — 1200000000 HC SEMI PRIVATE

## 2025-05-17 PROCEDURE — 84145 PROCALCITONIN (PCT): CPT

## 2025-05-17 PROCEDURE — 6360000002 HC RX W HCPCS: Performed by: NURSE PRACTITIONER

## 2025-05-17 RX ORDER — LEVOFLOXACIN 500 MG/1
500 TABLET, FILM COATED ORAL
Status: DISCONTINUED | OUTPATIENT
Start: 2025-05-17 | End: 2025-05-18 | Stop reason: HOSPADM

## 2025-05-17 RX ORDER — 0.9 % SODIUM CHLORIDE 0.9 %
500 INTRAVENOUS SOLUTION INTRAVENOUS ONCE
Status: COMPLETED | OUTPATIENT
Start: 2025-05-17 | End: 2025-05-17

## 2025-05-17 RX ADMIN — SODIUM CHLORIDE: 0.9 INJECTION, SOLUTION INTRAVENOUS at 04:34

## 2025-05-17 RX ADMIN — ACETAMINOPHEN 650 MG: 325 TABLET ORAL at 02:54

## 2025-05-17 RX ADMIN — BUDESONIDE AND FORMOTEROL FUMARATE DIHYDRATE 2 PUFF: 160; 4.5 AEROSOL RESPIRATORY (INHALATION) at 09:18

## 2025-05-17 RX ADMIN — IPRATROPIUM BROMIDE AND ALBUTEROL SULFATE 1 DOSE: 2.5; .5 SOLUTION RESPIRATORY (INHALATION) at 19:44

## 2025-05-17 RX ADMIN — GUAIFENESIN 600 MG: 600 TABLET, EXTENDED RELEASE ORAL at 20:01

## 2025-05-17 RX ADMIN — SODIUM CHLORIDE 500 ML: 0.9 INJECTION, SOLUTION INTRAVENOUS at 06:18

## 2025-05-17 RX ADMIN — SODIUM CHLORIDE, PRESERVATIVE FREE 10 ML: 5 INJECTION INTRAVENOUS at 20:02

## 2025-05-17 RX ADMIN — IPRATROPIUM BROMIDE AND ALBUTEROL SULFATE 1 DOSE: 2.5; .5 SOLUTION RESPIRATORY (INHALATION) at 12:54

## 2025-05-17 RX ADMIN — BUDESONIDE AND FORMOTEROL FUMARATE DIHYDRATE 2 PUFF: 160; 4.5 AEROSOL RESPIRATORY (INHALATION) at 19:43

## 2025-05-17 RX ADMIN — PREDNISONE 40 MG: 20 TABLET ORAL at 08:45

## 2025-05-17 RX ADMIN — ACETAMINOPHEN 650 MG: 325 TABLET ORAL at 20:01

## 2025-05-17 RX ADMIN — LEVOFLOXACIN 500 MG: 500 TABLET, FILM COATED ORAL at 10:49

## 2025-05-17 RX ADMIN — IPRATROPIUM BROMIDE AND ALBUTEROL SULFATE 1 DOSE: 2.5; .5 SOLUTION RESPIRATORY (INHALATION) at 09:18

## 2025-05-17 RX ADMIN — SODIUM CHLORIDE: 0.9 INJECTION, SOLUTION INTRAVENOUS at 12:39

## 2025-05-17 RX ADMIN — IPRATROPIUM BROMIDE AND ALBUTEROL SULFATE 1 DOSE: 2.5; .5 SOLUTION RESPIRATORY (INHALATION) at 15:51

## 2025-05-17 RX ADMIN — GUAIFENESIN 600 MG: 600 TABLET, EXTENDED RELEASE ORAL at 08:45

## 2025-05-17 RX ADMIN — ENOXAPARIN SODIUM 40 MG: 100 INJECTION SUBCUTANEOUS at 08:45

## 2025-05-17 ASSESSMENT — PAIN SCALES - GENERAL
PAINLEVEL_OUTOF10: 0

## 2025-05-17 NOTE — PROGRESS NOTES
V2.0    [facility] – [place], [place] Progress Note      Name:  Loco Dougherty /Age/Sex: 1945  (80 y.o. male)   MRN & CSN:  [id number] & [id number] Encounter Date/Time: 2025 12:12 PM EDT   Location:   PCP: Maverick Noble MD     Attending:Jamey Vinson MD       Hospital Day: 2    Assessment and Recommendations   Loco Dougherty is a 80 y.o. male who presents with Acute respiratory failure with hypoxia (HCC)      Plan:       Acute respiratory failure with hypoxia  Acute COPD exacerbation  Severe sepsis   -no evidence of hemodynamic instability  -Criteria met admission, lactic acidosis (2.1), tachycardia, leukocytosis (17.1)  -CXR nonacute  -VBG 7.337  - Repeat lactic acid 1.7 after hydration. - D-dimer 0.5  -CTA Negative for an acute pulmonary embolus. Tree-in-bud opacities within the right middle lobe suggestive of a developing infiltrate. Short-term follow-up with a CT of chest without contrast in one to 2 months is recommended.  Diffuse centrilobular emphysematous changes are noted.   -Bronchodilators/ICS  -Pulmonary hygiene  -Pending cultures/pneumonia PCR  -Steroids: Prednisone  -Antibiotics: Levaquin day 2  - wean oxygen as tolerated  -Dispo: Hopefully home in next 24 hours     Elevated troponin  Flat declining trend.  EKG without acute ischemic changes-sinus tachycardia  Likely in setting of hypoxia  Low suspicion for ACS     Hypertension   - uncontrolled trends on admit, now improved  - Hx noted per chart review but no current medications   -Trends appear controlled     Obesity  - BMI 31.61  - Diet/activity as tolerated    Diet ADULT DIET; Regular   DVT Prophylaxis [x] Lovenox, []  Heparin, [] SCDs, [] Ambulation,  [] Eliquis, [] Xarelto  [] Coumadin   Code Status Full Code   Disposition From: Home  Expected Disposition: Home  Estimated Date of Discharge:   Patient requires continued admission due to monitoring of above   Surrogate Decision Maker/ POA Spouse     Personally reviewed Lab Studies

## 2025-05-17 NOTE — PLAN OF CARE
Problem: Safety - Adult  Goal: Free from fall injury  5/17/2025 1933 by Anni Perez RN  Outcome: Progressing  5/17/2025 1002 by Mary Kay Levin RN  Outcome: Adequate for Discharge     Problem: Chronic Conditions and Co-morbidities  Goal: Patient's chronic conditions and co-morbidity symptoms are monitored and maintained or improved  5/17/2025 1933 by Anni Perez RN  Outcome: Progressing  5/17/2025 1002 by Mary Kay Levin RN  Outcome: Adequate for Discharge     Problem: Respiratory - Adult  Goal: Achieves optimal ventilation and oxygenation  5/17/2025 1933 by Anni Perez RN  Outcome: Progressing  5/17/2025 1002 by Mary Kay Levin RN  Outcome: Not Progressing     Problem: Infection - Adult  Goal: Absence of infection at discharge  5/17/2025 1933 by Anni Perez RN  Outcome: Progressing  5/17/2025 1002 by Mary Kay Levin RN  Outcome: Not Progressing  Goal: Absence of infection during hospitalization  5/17/2025 1933 by Anni Perez RN  Outcome: Progressing  5/17/2025 1002 by Mary Kay Levin RN  Outcome: Not Progressing     Problem: Respiratory - Adult  Goal: Achieves optimal ventilation and oxygenation  5/17/2025 1933 by Anni Perez RN  Outcome: Progressing  5/17/2025 1002 by Mary Kay Levin RN  Outcome: Not Progressing     Problem: Infection - Adult  Goal: Absence of infection at discharge  5/17/2025 1933 by Anni Perez RN  Outcome: Progressing  5/17/2025 1002 by Mary Kay Levin RN  Outcome: Not Progressing  Goal: Absence of infection during hospitalization  5/17/2025 1933 by Anni Perez RN  Outcome: Progressing  5/17/2025 1002 by Mary Kay Levin RN  Outcome: Not Progressing

## 2025-05-17 NOTE — PLAN OF CARE
Problem: Safety - Adult  Goal: Free from fall injury  5/17/2025 1002 by Mary Kay Levin RN  Outcome: Adequate for Discharge  5/16/2025 2042 by Camille Ramos RN  Outcome: Progressing     Problem: Chronic Conditions and Co-morbidities  Goal: Patient's chronic conditions and co-morbidity symptoms are monitored and maintained or improved  5/17/2025 1002 by Mary Kay Levin RN  Outcome: Adequate for Discharge  5/16/2025 2042 by Camille Ramos RN  Outcome: Progressing     Problem: Respiratory - Adult  Goal: Achieves optimal ventilation and oxygenation  Outcome: Not Progressing     Problem: Infection - Adult  Goal: Absence of infection at discharge  Outcome: Not Progressing  Goal: Absence of infection during hospitalization  Outcome: Not Progressing     Problem: Respiratory - Adult  Goal: Achieves optimal ventilation and oxygenation  Outcome: Not Progressing     Problem: Infection - Adult  Goal: Absence of infection at discharge  Outcome: Not Progressing  Goal: Absence of infection during hospitalization  Outcome: Not Progressing

## 2025-05-18 VITALS
SYSTOLIC BLOOD PRESSURE: 144 MMHG | RESPIRATION RATE: 18 BRPM | DIASTOLIC BLOOD PRESSURE: 91 MMHG | TEMPERATURE: 97.3 F | HEIGHT: 64 IN | BODY MASS INDEX: 32.85 KG/M2 | OXYGEN SATURATION: 96 % | HEART RATE: 87 BPM | WEIGHT: 192.4 LBS

## 2025-05-18 LAB
ALBUMIN SERPL-MCNC: 3.4 G/DL (ref 3.4–5)
ALBUMIN/GLOB SERPL: 1.9 {RATIO}
ALP SERPL-CCNC: 76 U/L (ref 40–129)
ALT SERPL-CCNC: 31 U/L (ref 10–40)
ANION GAP SERPL CALCULATED.3IONS-SCNC: 10 MMOL/L (ref 9–17)
AST SERPL-CCNC: 22 U/L (ref 15–37)
BILIRUB SERPL-MCNC: 0.3 MG/DL (ref 0–1)
BUN SERPL-MCNC: 11 MG/DL (ref 7–20)
CALCIUM SERPL-MCNC: 8.1 MG/DL (ref 8.3–10.6)
CHLORIDE SERPL-SCNC: 113 MMOL/L (ref 99–110)
CO2 SERPL-SCNC: 21 MMOL/L (ref 21–32)
CREAT SERPL-MCNC: 0.8 MG/DL (ref 0.8–1.3)
ERYTHROCYTE [DISTWIDTH] IN BLOOD BY AUTOMATED COUNT: 14.8 % (ref 11.7–14.9)
GFR, ESTIMATED: >90 ML/MIN/1.73M2
GLUCOSE SERPL-MCNC: 111 MG/DL (ref 74–99)
HCT VFR BLD AUTO: 38.1 % (ref 42–52)
HGB BLD-MCNC: 11.6 G/DL (ref 13.5–18)
MCH RBC QN AUTO: 25.8 PG (ref 27–31)
MCHC RBC AUTO-ENTMCNC: 30.4 G/DL (ref 32–36)
MCV RBC AUTO: 84.9 FL (ref 78–100)
PLATELET # BLD AUTO: 210 K/UL (ref 140–440)
PMV BLD AUTO: 9.2 FL (ref 7.5–11.1)
POTASSIUM SERPL-SCNC: 4.2 MMOL/L (ref 3.5–5.1)
PROT SERPL-MCNC: 5.2 G/DL (ref 6.4–8.2)
RBC # BLD AUTO: 4.49 M/UL (ref 4.6–6.2)
SODIUM SERPL-SCNC: 145 MMOL/L (ref 136–145)
TSH SERPL DL<=0.05 MIU/L-ACNC: 0.14 UIU/ML (ref 0.27–4.2)
WBC OTHER # BLD: 16.8 K/UL (ref 4–10.5)

## 2025-05-18 PROCEDURE — 2500000003 HC RX 250 WO HCPCS: Performed by: NURSE PRACTITIONER

## 2025-05-18 PROCEDURE — 6370000000 HC RX 637 (ALT 250 FOR IP): Performed by: NURSE PRACTITIONER

## 2025-05-18 PROCEDURE — 85027 COMPLETE CBC AUTOMATED: CPT

## 2025-05-18 PROCEDURE — 6360000002 HC RX W HCPCS: Performed by: NURSE PRACTITIONER

## 2025-05-18 PROCEDURE — 84439 ASSAY OF FREE THYROXINE: CPT

## 2025-05-18 PROCEDURE — 80053 COMPREHEN METABOLIC PANEL: CPT

## 2025-05-18 PROCEDURE — 84443 ASSAY THYROID STIM HORMONE: CPT

## 2025-05-18 PROCEDURE — 94640 AIRWAY INHALATION TREATMENT: CPT

## 2025-05-18 PROCEDURE — 36415 COLL VENOUS BLD VENIPUNCTURE: CPT

## 2025-05-18 RX ORDER — GUAIFENESIN 400 MG/1
400 TABLET ORAL DAILY
Qty: 14 TABLET | Refills: 0 | Status: SHIPPED | OUTPATIENT
Start: 2025-05-18 | End: 2025-06-01

## 2025-05-18 RX ORDER — METHYLPREDNISOLONE 4 MG/1
TABLET ORAL
Qty: 1 KIT | Refills: 0 | Status: SHIPPED | OUTPATIENT
Start: 2025-05-18 | End: 2025-05-24

## 2025-05-18 RX ORDER — LEVOFLOXACIN 500 MG/1
500 TABLET, FILM COATED ORAL
Qty: 4 TABLET | Refills: 0 | Status: SHIPPED | OUTPATIENT
Start: 2025-05-19 | End: 2025-05-23

## 2025-05-18 RX ADMIN — IPRATROPIUM BROMIDE AND ALBUTEROL SULFATE 1 DOSE: 2.5; .5 SOLUTION RESPIRATORY (INHALATION) at 10:35

## 2025-05-18 RX ADMIN — GUAIFENESIN 600 MG: 600 TABLET, EXTENDED RELEASE ORAL at 09:03

## 2025-05-18 RX ADMIN — IPRATROPIUM BROMIDE AND ALBUTEROL SULFATE 1 DOSE: 2.5; .5 SOLUTION RESPIRATORY (INHALATION) at 07:28

## 2025-05-18 RX ADMIN — ENOXAPARIN SODIUM 40 MG: 100 INJECTION SUBCUTANEOUS at 09:03

## 2025-05-18 RX ADMIN — SODIUM CHLORIDE, PRESERVATIVE FREE 10 ML: 5 INJECTION INTRAVENOUS at 09:04

## 2025-05-18 RX ADMIN — BUDESONIDE AND FORMOTEROL FUMARATE DIHYDRATE 2 PUFF: 160; 4.5 AEROSOL RESPIRATORY (INHALATION) at 07:27

## 2025-05-18 RX ADMIN — PREDNISONE 40 MG: 20 TABLET ORAL at 09:03

## 2025-05-18 RX ADMIN — LEVOFLOXACIN 500 MG: 500 TABLET, FILM COATED ORAL at 09:03

## 2025-05-18 ASSESSMENT — PAIN SCALES - GENERAL: PAINLEVEL_OUTOF10: 0

## 2025-05-18 NOTE — PROGRESS NOTES
Discharge instructions given to patient and wife, all questions answered, verbalized understanding, patient ambulated to car with all belongings per his request to be driven home by wife.

## 2025-05-18 NOTE — PLAN OF CARE
Problem: Safety - Adult  Goal: Free from fall injury  5/18/2025 1130 by Mary Kay Levin RN  Outcome: Completed  5/18/2025 0953 by Mary Kay Levin RN  Outcome: Adequate for Discharge     Problem: Chronic Conditions and Co-morbidities  Goal: Patient's chronic conditions and co-morbidity symptoms are monitored and maintained or improved  5/18/2025 1130 by Mary Kay Levin RN  Outcome: Completed  5/18/2025 0953 by Mary Kay Levin RN  Outcome: Adequate for Discharge     Problem: Respiratory - Adult  Goal: Achieves optimal ventilation and oxygenation  5/18/2025 1130 by Mary Kay Levin RN  Outcome: Completed  5/18/2025 0953 by Mary Kay Levin RN  Outcome: Adequate for Discharge     Problem: Infection - Adult  Goal: Absence of infection at discharge  5/18/2025 1130 by Mary Kay Levin RN  Outcome: Completed  5/18/2025 0953 by Mary Kay Levin RN  Outcome: Adequate for Discharge  Goal: Absence of infection during hospitalization  5/18/2025 1130 by Mary Kay Levin RN  Outcome: Completed  5/18/2025 0953 by Mary Kay Levin RN  Outcome: Adequate for Discharge

## 2025-05-18 NOTE — PLAN OF CARE
Problem: Safety - Adult  Goal: Free from fall injury  Outcome: Adequate for Discharge     Problem: Chronic Conditions and Co-morbidities  Goal: Patient's chronic conditions and co-morbidity symptoms are monitored and maintained or improved  Outcome: Adequate for Discharge     Problem: Respiratory - Adult  Goal: Achieves optimal ventilation and oxygenation  Outcome: Adequate for Discharge     Problem: Infection - Adult  Goal: Absence of infection at discharge  Outcome: Adequate for Discharge  Goal: Absence of infection during hospitalization  Outcome: Adequate for Discharge

## 2025-05-18 NOTE — DISCHARGE SUMMARY
V2.0  Discharge Summary    Name:  Loco Dougherty /Age/Sex: 1945 (80 y.o. male)   Admit Date: 2025  Discharge Date: 25    MRN & CSN:  [id number] & [id number] Encounter Date and Time 25 10:44 AM EDT    Attending:  Shey Meza MD Discharging Provider: ANA Santillan - CNP       Hospital Course:     Brief HPI: Loco Dougherty is a 80 y.o. male who presented with COPD, Pneumonia, Emphysema and chronic back pain whom presented with the following course:    Brief Problem Based Course:     1.Acute respiratory failure with hypoxia  Acute COPD exacerbation  Severe sepsis   -no evidence of hemodynamic instability  -Criteria met admission, lactic acidosis (2.1), tachycardia, leukocytosis (17.1)  -CXR nonacute  -VBG 7.337  - Repeat lactic acid 1.7 after hydration. - D-dimer 0.5  -CTA Negative for an acute pulmonary embolus. Tree-in-bud opacities within the right middle lobe suggestive of a developing infiltrate. Short-term follow-up with a CT of chest without contrast in one to 2 months is recommended.  Diffuse centrilobular emphysematous changes are noted.   -Bronchodilators/ICS  -Res sptutum cultures were ordered; patient had trouble coughing up/not collected  -legionella,strep pneumoniae negative  -Pulmonary hygiene; encouraged to use incentive spirometry even at home  -Steroids: Prednisone; medrol dose script sent  -Antibiotics: Levaquin day 3; script sent for 7 day course  -muccinex 14 days sent to [facility] per request  -patient verbalized he has plenty of his inhalers/bronchodilators at home and fills at [facility]/pays out of pocket for due to cost   -room air past 24 hours:  -RCP ambulated hallway on room air with stats 93-95%     2. Elevated troponin  -Flat declining trend.  -EKG without acute ischemic changes-sinus tachycardia  -Likely in setting of hypoxia  -Low suspicion for ACS, patient denies chest pain     3. Hypertension   - uncontrolled trends on admit, now improved  - Hx noted per  for: \"LABURIN\"  Blood Cultures: No results found for: \"BC\"  No results found for: \"BLOODCULT2\"  Organism: No results found for: \"ORG\"    Time Spent Discharging patient 40  minutes    Electronically signed by ANA Santillan CNP on 5/18/2025 at 10:44 AM

## 2025-05-19 LAB — T4 FREE SERPL-MCNC: 1 NG/DL (ref 0.9–1.8)

## 2025-05-21 LAB
MICROORGANISM SPEC CULT: NORMAL
MICROORGANISM SPEC CULT: NORMAL
SERVICE CMNT-IMP: NORMAL
SERVICE CMNT-IMP: NORMAL
SPECIMEN DESCRIPTION: NORMAL
SPECIMEN DESCRIPTION: NORMAL